# Patient Record
Sex: FEMALE | Race: WHITE | NOT HISPANIC OR LATINO | Employment: OTHER | URBAN - METROPOLITAN AREA
[De-identification: names, ages, dates, MRNs, and addresses within clinical notes are randomized per-mention and may not be internally consistent; named-entity substitution may affect disease eponyms.]

---

## 2017-01-06 ENCOUNTER — COMMUNICATION - HEALTHEAST (OUTPATIENT)
Dept: NURSING | Facility: CLINIC | Age: 62
End: 2017-01-06

## 2017-01-06 DIAGNOSIS — D68.51 FACTOR V LEIDEN (H): ICD-10-CM

## 2017-01-06 DIAGNOSIS — Z86.718 HISTORY OF DVT (DEEP VEIN THROMBOSIS): ICD-10-CM

## 2017-01-12 ENCOUNTER — COMMUNICATION - HEALTHEAST (OUTPATIENT)
Dept: FAMILY MEDICINE | Facility: CLINIC | Age: 62
End: 2017-01-12

## 2017-01-13 ENCOUNTER — COMMUNICATION - HEALTHEAST (OUTPATIENT)
Dept: FAMILY MEDICINE | Facility: CLINIC | Age: 62
End: 2017-01-13

## 2017-01-13 DIAGNOSIS — R06.2 WHEEZING: ICD-10-CM

## 2017-01-19 ENCOUNTER — COMMUNICATION - HEALTHEAST (OUTPATIENT)
Dept: FAMILY MEDICINE | Facility: CLINIC | Age: 62
End: 2017-01-19

## 2017-01-19 DIAGNOSIS — F33.9 MAJOR DEPRESSIVE DISORDER, RECURRENT EPISODE (H): ICD-10-CM

## 2017-02-02 ENCOUNTER — COMMUNICATION - HEALTHEAST (OUTPATIENT)
Dept: FAMILY MEDICINE | Facility: CLINIC | Age: 62
End: 2017-02-02

## 2017-02-02 DIAGNOSIS — R06.2 WHEEZING: ICD-10-CM

## 2017-03-16 ENCOUNTER — COMMUNICATION - HEALTHEAST (OUTPATIENT)
Dept: FAMILY MEDICINE | Facility: CLINIC | Age: 62
End: 2017-03-16

## 2017-03-16 DIAGNOSIS — G47.00 INSOMNIA: ICD-10-CM

## 2017-03-17 ENCOUNTER — COMMUNICATION - HEALTHEAST (OUTPATIENT)
Dept: FAMILY MEDICINE | Facility: CLINIC | Age: 62
End: 2017-03-17

## 2017-03-17 DIAGNOSIS — D68.2 FACTOR V DEFICIENCY (H): ICD-10-CM

## 2017-03-17 DIAGNOSIS — Z86.718 HISTORY OF DVT (DEEP VEIN THROMBOSIS): ICD-10-CM

## 2017-03-23 ENCOUNTER — COMMUNICATION - HEALTHEAST (OUTPATIENT)
Dept: LAB | Facility: CLINIC | Age: 62
End: 2017-03-23

## 2017-03-28 ENCOUNTER — COMMUNICATION - HEALTHEAST (OUTPATIENT)
Dept: FAMILY MEDICINE | Facility: CLINIC | Age: 62
End: 2017-03-28

## 2017-03-30 ENCOUNTER — AMBULATORY - HEALTHEAST (OUTPATIENT)
Dept: FAMILY MEDICINE | Facility: CLINIC | Age: 62
End: 2017-03-30

## 2017-03-30 DIAGNOSIS — I82.409 DVT (DEEP VENOUS THROMBOSIS) (H): ICD-10-CM

## 2017-04-20 ENCOUNTER — OFFICE VISIT - HEALTHEAST (OUTPATIENT)
Dept: FAMILY MEDICINE | Facility: CLINIC | Age: 62
End: 2017-04-20

## 2017-04-20 ENCOUNTER — AMBULATORY - HEALTHEAST (OUTPATIENT)
Dept: FAMILY MEDICINE | Facility: CLINIC | Age: 62
End: 2017-04-20

## 2017-04-20 DIAGNOSIS — F33.9 MAJOR DEPRESSIVE DISORDER, RECURRENT EPISODE (H): ICD-10-CM

## 2017-04-20 DIAGNOSIS — M54.12 CERVICAL RADICULOPATHY: ICD-10-CM

## 2017-04-20 DIAGNOSIS — G47.00 INSOMNIA: ICD-10-CM

## 2017-04-20 DIAGNOSIS — I82.409 DVT (DEEP VENOUS THROMBOSIS) (H): ICD-10-CM

## 2017-04-21 ENCOUNTER — COMMUNICATION - HEALTHEAST (OUTPATIENT)
Dept: INTERNAL MEDICINE | Facility: CLINIC | Age: 62
End: 2017-04-21

## 2017-04-21 DIAGNOSIS — I82.409 DVT (DEEP VENOUS THROMBOSIS) (H): ICD-10-CM

## 2017-04-21 DIAGNOSIS — D68.51 FACTOR V LEIDEN (H): ICD-10-CM

## 2017-05-13 ENCOUNTER — COMMUNICATION - HEALTHEAST (OUTPATIENT)
Dept: FAMILY MEDICINE | Facility: CLINIC | Age: 62
End: 2017-05-13

## 2017-05-18 ENCOUNTER — COMMUNICATION - HEALTHEAST (OUTPATIENT)
Dept: INTERNAL MEDICINE | Facility: CLINIC | Age: 62
End: 2017-05-18

## 2017-06-05 ENCOUNTER — AMBULATORY - HEALTHEAST (OUTPATIENT)
Dept: LAB | Facility: CLINIC | Age: 62
End: 2017-06-05

## 2017-06-05 ENCOUNTER — COMMUNICATION - HEALTHEAST (OUTPATIENT)
Dept: INTERNAL MEDICINE | Facility: CLINIC | Age: 62
End: 2017-06-05

## 2017-06-05 DIAGNOSIS — D68.51 FACTOR V LEIDEN (H): ICD-10-CM

## 2017-06-05 DIAGNOSIS — I82.409 DVT (DEEP VENOUS THROMBOSIS) (H): ICD-10-CM

## 2017-06-16 ENCOUNTER — COMMUNICATION - HEALTHEAST (OUTPATIENT)
Dept: FAMILY MEDICINE | Facility: CLINIC | Age: 62
End: 2017-06-16

## 2017-07-14 ENCOUNTER — COMMUNICATION - HEALTHEAST (OUTPATIENT)
Dept: FAMILY MEDICINE | Facility: CLINIC | Age: 62
End: 2017-07-14

## 2017-07-14 DIAGNOSIS — F32.A DEPRESSION: ICD-10-CM

## 2017-07-24 ENCOUNTER — COMMUNICATION - HEALTHEAST (OUTPATIENT)
Dept: INTERNAL MEDICINE | Facility: CLINIC | Age: 62
End: 2017-07-24

## 2017-07-28 ENCOUNTER — COMMUNICATION - HEALTHEAST (OUTPATIENT)
Dept: FAMILY MEDICINE | Facility: CLINIC | Age: 62
End: 2017-07-28

## 2017-07-28 DIAGNOSIS — D68.2 FACTOR V DEFICIENCY (H): ICD-10-CM

## 2017-07-28 DIAGNOSIS — Z86.718 HISTORY OF DVT (DEEP VEIN THROMBOSIS): ICD-10-CM

## 2017-08-17 ENCOUNTER — COMMUNICATION - HEALTHEAST (OUTPATIENT)
Dept: SCHEDULING | Facility: CLINIC | Age: 62
End: 2017-08-17

## 2017-08-18 ENCOUNTER — COMMUNICATION - HEALTHEAST (OUTPATIENT)
Dept: INTERNAL MEDICINE | Facility: CLINIC | Age: 62
End: 2017-08-18

## 2017-09-07 ENCOUNTER — COMMUNICATION - HEALTHEAST (OUTPATIENT)
Dept: INTERNAL MEDICINE | Facility: CLINIC | Age: 62
End: 2017-09-07

## 2017-10-09 ENCOUNTER — COMMUNICATION - HEALTHEAST (OUTPATIENT)
Dept: FAMILY MEDICINE | Facility: CLINIC | Age: 62
End: 2017-10-09

## 2017-10-09 DIAGNOSIS — G47.00 INSOMNIA: ICD-10-CM

## 2017-10-09 DIAGNOSIS — F32.A DEPRESSION: ICD-10-CM

## 2017-10-19 ENCOUNTER — OFFICE VISIT - HEALTHEAST (OUTPATIENT)
Dept: FAMILY MEDICINE | Facility: CLINIC | Age: 62
End: 2017-10-19

## 2017-10-19 ENCOUNTER — RECORDS - HEALTHEAST (OUTPATIENT)
Dept: GENERAL RADIOLOGY | Facility: CLINIC | Age: 62
End: 2017-10-19

## 2017-10-19 DIAGNOSIS — Z86.718 HISTORY OF DVT (DEEP VEIN THROMBOSIS): ICD-10-CM

## 2017-10-19 DIAGNOSIS — D68.51 FACTOR V LEIDEN (H): ICD-10-CM

## 2017-10-19 DIAGNOSIS — M79.646 FINGER PAIN: ICD-10-CM

## 2017-10-19 DIAGNOSIS — M79.646 PAIN IN UNSPECIFIED FINGER(S): ICD-10-CM

## 2017-10-19 DIAGNOSIS — F33.9 MAJOR DEPRESSIVE DISORDER, RECURRENT EPISODE (H): ICD-10-CM

## 2017-10-22 ENCOUNTER — COMMUNICATION - HEALTHEAST (OUTPATIENT)
Dept: FAMILY MEDICINE | Facility: CLINIC | Age: 62
End: 2017-10-22

## 2017-10-22 DIAGNOSIS — Z86.718 HISTORY OF DVT (DEEP VEIN THROMBOSIS): ICD-10-CM

## 2017-10-22 DIAGNOSIS — D68.2 FACTOR V DEFICIENCY (H): ICD-10-CM

## 2017-10-23 ENCOUNTER — COMMUNICATION - HEALTHEAST (OUTPATIENT)
Dept: FAMILY MEDICINE | Facility: CLINIC | Age: 62
End: 2017-10-23

## 2017-10-23 ENCOUNTER — COMMUNICATION - HEALTHEAST (OUTPATIENT)
Dept: INTERNAL MEDICINE | Facility: CLINIC | Age: 62
End: 2017-10-23

## 2017-10-23 LAB — ANA SER QL: 0.1 U

## 2017-12-08 ENCOUNTER — COMMUNICATION - HEALTHEAST (OUTPATIENT)
Dept: FAMILY MEDICINE | Facility: CLINIC | Age: 62
End: 2017-12-08

## 2017-12-08 DIAGNOSIS — F32.A DEPRESSION: ICD-10-CM

## 2017-12-29 ENCOUNTER — COMMUNICATION - HEALTHEAST (OUTPATIENT)
Dept: FAMILY MEDICINE | Facility: CLINIC | Age: 62
End: 2017-12-29

## 2017-12-29 ENCOUNTER — COMMUNICATION - HEALTHEAST (OUTPATIENT)
Dept: SCHEDULING | Facility: CLINIC | Age: 62
End: 2017-12-29

## 2017-12-29 DIAGNOSIS — R06.2 WHEEZING: ICD-10-CM

## 2017-12-29 DIAGNOSIS — M79.646 FINGER PAIN: ICD-10-CM

## 2018-01-05 ENCOUNTER — COMMUNICATION - HEALTHEAST (OUTPATIENT)
Dept: FAMILY MEDICINE | Facility: CLINIC | Age: 63
End: 2018-01-05

## 2018-01-05 DIAGNOSIS — G47.00 INSOMNIA: ICD-10-CM

## 2018-01-05 DIAGNOSIS — F32.A DEPRESSION: ICD-10-CM

## 2018-02-07 ENCOUNTER — OFFICE VISIT - HEALTHEAST (OUTPATIENT)
Dept: FAMILY MEDICINE | Facility: CLINIC | Age: 63
End: 2018-02-07

## 2018-02-07 ENCOUNTER — RECORDS - HEALTHEAST (OUTPATIENT)
Dept: GENERAL RADIOLOGY | Facility: CLINIC | Age: 63
End: 2018-02-07
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
## 2018-02-07 DIAGNOSIS — F33.1 MODERATE EPISODE OF RECURRENT MAJOR DEPRESSIVE DISORDER (H): ICD-10-CM

## 2018-02-07 DIAGNOSIS — M54.2 NECK PAIN ON LEFT SIDE: ICD-10-CM
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
## 2018-02-07 DIAGNOSIS — Z12.31 VISIT FOR SCREENING MAMMOGRAM: ICD-10-CM

## 2018-02-08 ENCOUNTER — COMMUNICATION - HEALTHEAST (OUTPATIENT)
Dept: FAMILY MEDICINE | Facility: CLINIC | Age: 63
End: 2018-02-08

## 2018-02-21 ENCOUNTER — OFFICE VISIT - HEALTHEAST (OUTPATIENT)
Dept: RHEUMATOLOGY | Facility: CLINIC | Age: 63
End: 2018-02-21

## 2018-02-21 DIAGNOSIS — M25.50 POLYARTHRALGIA: ICD-10-CM

## 2018-02-21 DIAGNOSIS — M15.0 PRIMARY OSTEOARTHRITIS INVOLVING MULTIPLE JOINTS: ICD-10-CM

## 2018-02-21 LAB
ALBUMIN SERPL-MCNC: 3.3 G/DL (ref 3.5–5)
ALT SERPL W P-5'-P-CCNC: 25 U/L (ref 0–45)
BASOPHILS # BLD AUTO: 0.1 THOU/UL (ref 0–0.2)
BASOPHILS NFR BLD AUTO: 1 % (ref 0–2)
C REACTIVE PROTEIN LHE: 0.2 MG/DL (ref 0–0.8)
CREAT SERPL-MCNC: 0.72 MG/DL (ref 0.6–1.1)
EOSINOPHIL # BLD AUTO: 0.2 THOU/UL (ref 0–0.4)
EOSINOPHIL NFR BLD AUTO: 3 % (ref 0–6)
ERYTHROCYTE [DISTWIDTH] IN BLOOD BY AUTOMATED COUNT: 13.1 % (ref 11–14.5)
ERYTHROCYTE [SEDIMENTATION RATE] IN BLOOD BY WESTERGREN METHOD: 28 MM/HR (ref 0–20)
GFR SERPL CREATININE-BSD FRML MDRD: >60 ML/MIN/1.73M2
HCT VFR BLD AUTO: 41.3 % (ref 35–47)
HGB BLD-MCNC: 14 G/DL (ref 12–16)
LYMPHOCYTES # BLD AUTO: 2.3 THOU/UL (ref 0.8–4.4)
LYMPHOCYTES NFR BLD AUTO: 37 % (ref 20–40)
MCH RBC QN AUTO: 30.7 PG (ref 27–34)
MCHC RBC AUTO-ENTMCNC: 33.8 G/DL (ref 32–36)
MCV RBC AUTO: 91 FL (ref 80–100)
MONOCYTES # BLD AUTO: 0.4 THOU/UL (ref 0–0.9)
MONOCYTES NFR BLD AUTO: 7 % (ref 2–10)
NEUTROPHILS # BLD AUTO: 3.2 THOU/UL (ref 2–7.7)
NEUTROPHILS NFR BLD AUTO: 51 % (ref 50–70)
PLATELET # BLD AUTO: 276 THOU/UL (ref 140–440)
PMV BLD AUTO: 7.4 FL (ref 7–10)
RBC # BLD AUTO: 4.54 MILL/UL (ref 3.8–5.4)
RHEUMATOID FACT SERPL-ACNC: <15 IU/ML (ref 0–30)
WBC: 6.2 THOU/UL (ref 4–11)

## 2018-02-21 ASSESSMENT — MIFFLIN-ST. JEOR: SCORE: 1451.27

## 2018-02-22 LAB
ANA SER QL: 0.1 U
CCP AB SER IA-ACNC: <0.5 U/ML
HCV AB SERPL QL IA: NEGATIVE

## 2018-02-25 ENCOUNTER — NURSE TRIAGE (OUTPATIENT)
Dept: NURSING | Facility: CLINIC | Age: 63
End: 2018-02-25

## 2018-02-25 NOTE — TELEPHONE ENCOUNTER
Reason for Disposition    Cough present > 10 days    Additional Information    Negative: Severe difficulty breathing (e.g., struggling for each breath, speaks in single words)    Negative: Bluish lips, tongue, or face now    Negative: [1] Difficulty breathing AND [2] exposure to flames, smoke, or fumes    Negative: [1] Stridor AND [2] difficulty breathing    Negative: Sounds like a life-threatening emergency to the triager    Negative: [1] Previous asthma attacks AND [2] this feels like asthma attack    Negative: Dry (non-productive) cough (i.e., no sputum or minimal clear sputum)    Negative: Chest pain  (Exception: MILD central chest pain, present only when coughing)    Negative: Difficulty breathing    Negative: Patient sounds very sick or weak to the triager    Negative: [1] Coughed up blood AND [2] > 1 tablespoon (15 ml) (Exception: blood-tinged sputum)    Negative: Fever > 103 F (39.4 C)    Negative: [1] Fever > 101 F (38.3 C) AND [2] age > 60    Negative: [1] Fever > 101 F (38.3 C) AND [2] bedridden (e.g., nursing home patient, CVA, chronic illness, recovering from surgery)    Negative: [1] Fever > 100.5 F (38.1 C) AND [2] diabetes mellitus or weak immune system (e.g., HIV positive, cancer chemo, splenectomy, organ transplant, chronic steroids)    Negative: Wheezing is present    Negative: SEVERE coughing spells (e.g., whooping sound after coughing, vomiting after coughing)    Negative: [1] Continuous (nonstop) coughing interferes with work or school AND [2] no improvement using cough treatment per Care Advice    Negative: Coughing up monica-colored (reddish-brown) sputum    Negative: Fever present > 3 days (72 hours)    Negative: [1] Fever returns after gone for over 24 hours AND [2] symptoms worse or not improved    Negative: [1] Sinus pain (around cheekbone or eye) AND [2] present > 24 hours using nasal washes and pain meds    Negative: Earache    Negative: [1] Known COPD or other severe lung disease  (i.e., bronchiectasis, cystic fibrosis, lung surgery) AND [2] worsening symptoms (i.e., increased sputum purulence or amount, increased breathing difficulty    Negative: [1] Coughed up blood-tinged sputum AND [2] more than once    Protocols used: COUGH - ACUTE PRODUCTIVE-ADULT-AH

## 2018-02-26 ENCOUNTER — OFFICE VISIT - HEALTHEAST (OUTPATIENT)
Dept: FAMILY MEDICINE | Facility: CLINIC | Age: 63
End: 2018-02-26

## 2018-02-26 DIAGNOSIS — I82.409 DVT (DEEP VENOUS THROMBOSIS) (H): ICD-10-CM

## 2018-02-26 DIAGNOSIS — R05.9 COUGH: ICD-10-CM

## 2018-02-26 LAB — INR PPP: 2.6 (ref 0.9–1.1)

## 2018-04-18 ENCOUNTER — COMMUNICATION - HEALTHEAST (OUTPATIENT)
Dept: FAMILY MEDICINE | Facility: CLINIC | Age: 63
End: 2018-04-18

## 2018-04-18 DIAGNOSIS — G47.00 INSOMNIA: ICD-10-CM

## 2018-05-01 ENCOUNTER — COMMUNICATION - HEALTHEAST (OUTPATIENT)
Dept: SCHEDULING | Facility: CLINIC | Age: 63
End: 2018-05-01

## 2018-05-04 ENCOUNTER — COMMUNICATION - HEALTHEAST (OUTPATIENT)
Dept: FAMILY MEDICINE | Facility: CLINIC | Age: 63
End: 2018-05-04

## 2018-05-04 DIAGNOSIS — Z86.718 HISTORY OF DVT (DEEP VEIN THROMBOSIS): ICD-10-CM

## 2018-05-04 DIAGNOSIS — D68.2 FACTOR V DEFICIENCY (H): ICD-10-CM

## 2018-05-23 ENCOUNTER — COMMUNICATION - HEALTHEAST (OUTPATIENT)
Dept: FAMILY MEDICINE | Facility: CLINIC | Age: 63
End: 2018-05-23

## 2018-05-23 DIAGNOSIS — F33.1 MODERATE EPISODE OF RECURRENT MAJOR DEPRESSIVE DISORDER (H): ICD-10-CM

## 2018-07-12 ENCOUNTER — COMMUNICATION - HEALTHEAST (OUTPATIENT)
Dept: FAMILY MEDICINE | Facility: CLINIC | Age: 63
End: 2018-07-12

## 2018-07-12 DIAGNOSIS — G47.00 INSOMNIA: ICD-10-CM

## 2018-08-01 ENCOUNTER — COMMUNICATION - HEALTHEAST (OUTPATIENT)
Dept: FAMILY MEDICINE | Facility: CLINIC | Age: 63
End: 2018-08-01

## 2018-08-01 DIAGNOSIS — D68.2 FACTOR V DEFICIENCY (H): ICD-10-CM

## 2018-08-01 DIAGNOSIS — Z86.718 HISTORY OF DVT (DEEP VEIN THROMBOSIS): ICD-10-CM

## 2018-09-19 ENCOUNTER — COMMUNICATION - HEALTHEAST (OUTPATIENT)
Dept: ANTICOAGULATION | Facility: CLINIC | Age: 63
End: 2018-09-19

## 2018-09-19 ENCOUNTER — AMBULATORY - HEALTHEAST (OUTPATIENT)
Dept: ANTICOAGULATION | Facility: CLINIC | Age: 63
End: 2018-09-19

## 2018-09-19 ENCOUNTER — OFFICE VISIT - HEALTHEAST (OUTPATIENT)
Dept: FAMILY MEDICINE | Facility: CLINIC | Age: 63
End: 2018-09-19

## 2018-09-19 DIAGNOSIS — D68.51 FACTOR V LEIDEN (H): ICD-10-CM

## 2018-09-19 DIAGNOSIS — F43.21 GRIEF REACTION: ICD-10-CM

## 2018-09-19 DIAGNOSIS — I82.409 DEEP VEIN THROMBOSIS (DVT) OF LOWER EXTREMITY (H): ICD-10-CM

## 2018-09-19 DIAGNOSIS — Z71.6 TOBACCO ABUSE COUNSELING: ICD-10-CM

## 2018-09-19 DIAGNOSIS — F33.1 MODERATE EPISODE OF RECURRENT MAJOR DEPRESSIVE DISORDER (H): ICD-10-CM

## 2018-09-19 DIAGNOSIS — I82.811 VENOUS EMBOLISM AND THROMBOSIS OF SUPERFICIAL VESSELS OF RIGHT LOWER EXTREMITY: ICD-10-CM

## 2018-09-19 LAB — INR PPP: 2.6 (ref 0.9–1.1)

## 2018-09-19 ASSESSMENT — MIFFLIN-ST. JEOR: SCORE: 1424.05

## 2018-09-26 ENCOUNTER — TELEPHONE (OUTPATIENT)
Dept: OTHER | Facility: CLINIC | Age: 63
End: 2018-09-26

## 2018-10-01 ENCOUNTER — COMMUNICATION - HEALTHEAST (OUTPATIENT)
Dept: FAMILY MEDICINE | Facility: CLINIC | Age: 63
End: 2018-10-01

## 2018-10-01 DIAGNOSIS — D68.51 FACTOR V LEIDEN (H): ICD-10-CM

## 2018-10-18 ENCOUNTER — COMMUNICATION - HEALTHEAST (OUTPATIENT)
Dept: ANTICOAGULATION | Facility: CLINIC | Age: 63
End: 2018-10-18

## 2018-10-18 ENCOUNTER — AMBULATORY - HEALTHEAST (OUTPATIENT)
Dept: LAB | Facility: CLINIC | Age: 63
End: 2018-10-18

## 2018-10-18 DIAGNOSIS — D68.51 FACTOR V LEIDEN (H): ICD-10-CM

## 2018-10-18 LAB — INR PPP: 2.2 (ref 0.9–1.1)

## 2018-10-28 ENCOUNTER — COMMUNICATION - HEALTHEAST (OUTPATIENT)
Dept: FAMILY MEDICINE | Facility: CLINIC | Age: 63
End: 2018-10-28

## 2018-10-28 DIAGNOSIS — D68.2 FACTOR V DEFICIENCY (H): ICD-10-CM

## 2018-10-28 DIAGNOSIS — Z86.718 HISTORY OF DVT (DEEP VEIN THROMBOSIS): ICD-10-CM

## 2018-11-08 ENCOUNTER — COMMUNICATION - HEALTHEAST (OUTPATIENT)
Dept: ANTICOAGULATION | Facility: CLINIC | Age: 63
End: 2018-11-08

## 2018-11-14 ENCOUNTER — COMMUNICATION - HEALTHEAST (OUTPATIENT)
Dept: ANTICOAGULATION | Facility: CLINIC | Age: 63
End: 2018-11-14

## 2018-11-14 ENCOUNTER — AMBULATORY - HEALTHEAST (OUTPATIENT)
Dept: LAB | Facility: CLINIC | Age: 63
End: 2018-11-14

## 2018-11-14 DIAGNOSIS — D68.51 FACTOR V LEIDEN (H): ICD-10-CM

## 2018-11-14 LAB — INR PPP: 2.7 (ref 0.9–1.1)

## 2018-11-20 ENCOUNTER — COMMUNICATION - HEALTHEAST (OUTPATIENT)
Dept: ANTICOAGULATION | Facility: CLINIC | Age: 63
End: 2018-11-20

## 2018-11-20 DIAGNOSIS — D68.51 FACTOR V LEIDEN (H): ICD-10-CM

## 2018-11-20 DIAGNOSIS — I82.409 DEEP VEIN THROMBOSIS (DVT) OF LOWER EXTREMITY (H): ICD-10-CM

## 2018-12-19 ENCOUNTER — COMMUNICATION - HEALTHEAST (OUTPATIENT)
Dept: ANTICOAGULATION | Facility: CLINIC | Age: 63
End: 2018-12-19

## 2019-02-12 ENCOUNTER — COMMUNICATION - HEALTHEAST (OUTPATIENT)
Dept: FAMILY MEDICINE | Facility: CLINIC | Age: 64
End: 2019-02-12

## 2019-02-28 ENCOUNTER — COMMUNICATION - HEALTHEAST (OUTPATIENT)
Dept: ANTICOAGULATION | Facility: CLINIC | Age: 64
End: 2019-02-28

## 2019-02-28 ENCOUNTER — OFFICE VISIT - HEALTHEAST (OUTPATIENT)
Dept: FAMILY MEDICINE | Facility: CLINIC | Age: 64
End: 2019-02-28

## 2019-02-28 ENCOUNTER — COMMUNICATION - HEALTHEAST (OUTPATIENT)
Dept: TELEHEALTH | Facility: CLINIC | Age: 64
End: 2019-02-28

## 2019-02-28 DIAGNOSIS — D68.51 FACTOR V LEIDEN (H): ICD-10-CM

## 2019-02-28 DIAGNOSIS — G47.00 INSOMNIA: ICD-10-CM

## 2019-02-28 DIAGNOSIS — I83.811 VARICOSE VEINS OF RIGHT LOWER EXTREMITY WITH PAIN: ICD-10-CM

## 2019-02-28 DIAGNOSIS — Z12.4 CERVICAL CANCER SCREENING: ICD-10-CM

## 2019-02-28 DIAGNOSIS — Z11.59 ENCOUNTER FOR HEPATITIS C SCREENING TEST FOR LOW RISK PATIENT: ICD-10-CM

## 2019-02-28 DIAGNOSIS — I82.409 DEEP VEIN THROMBOSIS (DVT) OF LOWER EXTREMITY (H): ICD-10-CM

## 2019-02-28 DIAGNOSIS — N89.8 VAGINAL DISCHARGE: ICD-10-CM

## 2019-02-28 DIAGNOSIS — F51.01 PRIMARY INSOMNIA: ICD-10-CM

## 2019-02-28 DIAGNOSIS — Z12.31 VISIT FOR SCREENING MAMMOGRAM: ICD-10-CM

## 2019-02-28 DIAGNOSIS — F33.1 MODERATE EPISODE OF RECURRENT MAJOR DEPRESSIVE DISORDER (H): ICD-10-CM

## 2019-02-28 DIAGNOSIS — Z00.00 HEALTHCARE MAINTENANCE: ICD-10-CM

## 2019-02-28 DIAGNOSIS — Z71.6 TOBACCO ABUSE COUNSELING: ICD-10-CM

## 2019-02-28 LAB
ALBUMIN SERPL-MCNC: 3.8 G/DL (ref 3.5–5)
ALP SERPL-CCNC: 94 U/L (ref 45–120)
ALT SERPL W P-5'-P-CCNC: 31 U/L (ref 0–45)
ANION GAP SERPL CALCULATED.3IONS-SCNC: 8 MMOL/L (ref 5–18)
AST SERPL W P-5'-P-CCNC: 29 U/L (ref 0–40)
BILIRUB SERPL-MCNC: 0.3 MG/DL (ref 0–1)
BUN SERPL-MCNC: 12 MG/DL (ref 8–22)
CALCIUM SERPL-MCNC: 9.2 MG/DL (ref 8.5–10.5)
CHLORIDE BLD-SCNC: 104 MMOL/L (ref 98–107)
CHOLEST SERPL-MCNC: 182 MG/DL
CLUE CELLS: NORMAL
CO2 SERPL-SCNC: 27 MMOL/L (ref 22–31)
CREAT SERPL-MCNC: 0.8 MG/DL (ref 0.6–1.1)
FASTING STATUS PATIENT QL REPORTED: NO
GFR SERPL CREATININE-BSD FRML MDRD: >60 ML/MIN/1.73M2
GLUCOSE BLD-MCNC: 82 MG/DL (ref 70–125)
HDLC SERPL-MCNC: 80 MG/DL
INR PPP: 2.9 (ref 0.9–1.1)
LDLC SERPL CALC-MCNC: 89 MG/DL
POTASSIUM BLD-SCNC: 4.3 MMOL/L (ref 3.5–5)
PROT SERPL-MCNC: 6.6 G/DL (ref 6–8)
SODIUM SERPL-SCNC: 139 MMOL/L (ref 136–145)
TRICHOMONAS, WET PREP: NORMAL
TRIGL SERPL-MCNC: 67 MG/DL
YEAST, WET PREP: NORMAL

## 2019-02-28 ASSESSMENT — MIFFLIN-ST. JEOR: SCORE: 1419.52

## 2019-03-01 LAB
HCV AB SERPL QL IA: NEGATIVE
HPV SOURCE: ABNORMAL
HUMAN PAPILLOMA VIRUS 16 DNA: NEGATIVE
HUMAN PAPILLOMA VIRUS 18 DNA: NEGATIVE
HUMAN PAPILLOMA VIRUS FINAL DIAGNOSIS: ABNORMAL
HUMAN PAPILLOMA VIRUS OTHER HR: POSITIVE
SPECIMEN DESCRIPTION: ABNORMAL

## 2019-03-09 ENCOUNTER — COMMUNICATION - HEALTHEAST (OUTPATIENT)
Dept: FAMILY MEDICINE | Facility: CLINIC | Age: 64
End: 2019-03-09

## 2019-03-09 DIAGNOSIS — F33.1 MODERATE EPISODE OF RECURRENT MAJOR DEPRESSIVE DISORDER (H): ICD-10-CM

## 2019-03-11 LAB
BKR LAB AP ABNORMAL BLEEDING: NO
BKR LAB AP BIRTH CONTROL/HORMONES: NORMAL
BKR LAB AP CERVICAL APPEARANCE: NORMAL
BKR LAB AP GYN ADEQUACY: NORMAL
BKR LAB AP GYN INTERPRETATION: NORMAL
BKR LAB AP HPV REFLEX: NORMAL
BKR LAB AP LMP: NORMAL
BKR LAB AP PATIENT STATUS: NORMAL
BKR LAB AP PREVIOUS ABNORMAL: NO
BKR LAB AP PREVIOUS NORMAL: NORMAL
HIGH RISK?: NO
PATH REPORT.COMMENTS IMP SPEC: NORMAL
RESULT FLAG (HE HISTORICAL CONVERSION): NORMAL

## 2019-03-12 ENCOUNTER — COMMUNICATION - HEALTHEAST (OUTPATIENT)
Dept: FAMILY MEDICINE | Facility: CLINIC | Age: 64
End: 2019-03-12

## 2019-03-18 ENCOUNTER — COMMUNICATION - HEALTHEAST (OUTPATIENT)
Dept: FAMILY MEDICINE | Facility: CLINIC | Age: 64
End: 2019-03-18

## 2019-03-20 ENCOUNTER — COMMUNICATION - HEALTHEAST (OUTPATIENT)
Dept: FAMILY MEDICINE | Facility: CLINIC | Age: 64
End: 2019-03-20

## 2019-03-22 ENCOUNTER — COMMUNICATION - HEALTHEAST (OUTPATIENT)
Dept: FAMILY MEDICINE | Facility: CLINIC | Age: 64
End: 2019-03-22

## 2019-03-22 ENCOUNTER — HOSPITAL ENCOUNTER (OUTPATIENT)
Dept: CT IMAGING | Facility: HOSPITAL | Age: 64
Discharge: HOME | End: 2019-03-22
Attending: FAMILY MEDICINE

## 2019-03-22 DIAGNOSIS — Z71.6 TOBACCO ABUSE COUNSELING: ICD-10-CM

## 2019-03-27 ENCOUNTER — COMMUNICATION - HEALTHEAST (OUTPATIENT)
Dept: FAMILY MEDICINE | Facility: CLINIC | Age: 64
End: 2019-03-27

## 2019-04-04 ENCOUNTER — COMMUNICATION - HEALTHEAST (OUTPATIENT)
Dept: ANTICOAGULATION | Facility: CLINIC | Age: 64
End: 2019-04-04

## 2019-05-02 ENCOUNTER — OFFICE VISIT - HEALTHEAST (OUTPATIENT)
Dept: FAMILY MEDICINE | Facility: CLINIC | Age: 64
End: 2019-05-02

## 2019-05-02 ENCOUNTER — COMMUNICATION - HEALTHEAST (OUTPATIENT)
Dept: ANTICOAGULATION | Facility: CLINIC | Age: 64
End: 2019-05-02

## 2019-05-02 ENCOUNTER — COMMUNICATION - HEALTHEAST (OUTPATIENT)
Dept: FAMILY MEDICINE | Facility: CLINIC | Age: 64
End: 2019-05-02

## 2019-05-02 DIAGNOSIS — D68.51 FACTOR V LEIDEN (H): ICD-10-CM

## 2019-05-02 DIAGNOSIS — D68.2 FACTOR V DEFICIENCY (H): ICD-10-CM

## 2019-05-02 DIAGNOSIS — Z86.718 HISTORY OF DVT (DEEP VEIN THROMBOSIS): ICD-10-CM

## 2019-05-02 DIAGNOSIS — J18.1 LOBAR PNEUMONIA, UNSPECIFIED ORGANISM (H): ICD-10-CM

## 2019-05-02 DIAGNOSIS — I82.409 DEEP VEIN THROMBOSIS (DVT) OF LOWER EXTREMITY (H): ICD-10-CM

## 2019-05-02 DIAGNOSIS — R07.89 CHEST WALL PAIN: ICD-10-CM

## 2019-05-02 DIAGNOSIS — J18.9 COMMUNITY ACQUIRED PNEUMONIA OF RIGHT LOWER LOBE OF LUNG: ICD-10-CM

## 2019-05-02 LAB — INR PPP: 3.5 (ref 0.9–1.1)

## 2019-05-02 ASSESSMENT — MIFFLIN-ST. JEOR: SCORE: 1437.66

## 2019-05-07 ENCOUNTER — COMMUNICATION - HEALTHEAST (OUTPATIENT)
Dept: ANTICOAGULATION | Facility: CLINIC | Age: 64
End: 2019-05-07

## 2019-05-17 ENCOUNTER — COMMUNICATION - HEALTHEAST (OUTPATIENT)
Dept: SCHEDULING | Facility: CLINIC | Age: 64
End: 2019-05-17

## 2019-05-17 ENCOUNTER — RECORDS - HEALTHEAST (OUTPATIENT)
Dept: GENERAL RADIOLOGY | Facility: CLINIC | Age: 64
End: 2019-05-17

## 2019-05-17 ENCOUNTER — OFFICE VISIT - HEALTHEAST (OUTPATIENT)
Dept: FAMILY MEDICINE | Facility: CLINIC | Age: 64
End: 2019-05-17

## 2019-05-17 ENCOUNTER — COMMUNICATION - HEALTHEAST (OUTPATIENT)
Dept: ANTICOAGULATION | Facility: CLINIC | Age: 64
End: 2019-05-17

## 2019-05-17 DIAGNOSIS — D68.51 FACTOR V LEIDEN (H): ICD-10-CM

## 2019-05-17 DIAGNOSIS — M79.661 PAIN IN RIGHT LOWER LEG: ICD-10-CM

## 2019-05-17 DIAGNOSIS — I82.409 DEEP VEIN THROMBOSIS (DVT) OF LOWER EXTREMITY (H): ICD-10-CM

## 2019-05-17 DIAGNOSIS — M79.661 PAIN OF RIGHT LOWER LEG: ICD-10-CM

## 2019-05-17 DIAGNOSIS — S80.11XA CONTUSION OF RIGHT LOWER EXTREMITY, INITIAL ENCOUNTER: ICD-10-CM

## 2019-05-17 LAB
ERYTHROCYTE [DISTWIDTH] IN BLOOD BY AUTOMATED COUNT: 13.4 % (ref 11–14.5)
HCT VFR BLD AUTO: 40.8 % (ref 35–47)
HGB BLD-MCNC: 13.4 G/DL (ref 12–16)
INR PPP: 2.2 (ref 0.9–1.1)
MCH RBC QN AUTO: 30.6 PG (ref 27–34)
MCHC RBC AUTO-ENTMCNC: 32.8 G/DL (ref 32–36)
MCV RBC AUTO: 93 FL (ref 80–100)
PLATELET # BLD AUTO: 258 THOU/UL (ref 140–440)
PMV BLD AUTO: 7.6 FL (ref 7–10)
RBC # BLD AUTO: 4.37 MILL/UL (ref 3.8–5.4)
WBC: 4.7 THOU/UL (ref 4–11)

## 2019-05-17 ASSESSMENT — MIFFLIN-ST. JEOR: SCORE: 1461.19

## 2019-05-23 ASSESSMENT — MIFFLIN-ST. JEOR: SCORE: 1460.06

## 2019-05-24 ENCOUNTER — SURGERY - HEALTHEAST (OUTPATIENT)
Dept: SURGERY | Facility: HOSPITAL | Age: 64
End: 2019-05-24

## 2019-05-24 ENCOUNTER — ANESTHESIA - HEALTHEAST (OUTPATIENT)
Dept: SURGERY | Facility: HOSPITAL | Age: 64
End: 2019-05-24

## 2019-05-26 ENCOUNTER — HOME CARE/HOSPICE - HEALTHEAST (OUTPATIENT)
Dept: HOME HEALTH SERVICES | Facility: HOME HEALTH | Age: 64
End: 2019-05-26

## 2019-05-27 ENCOUNTER — COMMUNICATION - HEALTHEAST (OUTPATIENT)
Dept: HOME HEALTH SERVICES | Facility: HOME HEALTH | Age: 64
End: 2019-05-27

## 2019-05-28 ENCOUNTER — OFFICE VISIT - HEALTHEAST (OUTPATIENT)
Dept: FAMILY MEDICINE | Facility: CLINIC | Age: 64
End: 2019-05-28

## 2019-05-28 ENCOUNTER — COMMUNICATION - HEALTHEAST (OUTPATIENT)
Dept: ANTICOAGULATION | Facility: CLINIC | Age: 64
End: 2019-05-28

## 2019-05-28 DIAGNOSIS — D64.9 NORMOCHROMIC NORMOCYTIC ANEMIA: ICD-10-CM

## 2019-05-28 DIAGNOSIS — D68.51 FACTOR V LEIDEN (H): ICD-10-CM

## 2019-05-28 DIAGNOSIS — S52.532D CLOSED COLLES' FRACTURE OF LEFT RADIUS WITH ROUTINE HEALING, SUBSEQUENT ENCOUNTER: ICD-10-CM

## 2019-05-28 DIAGNOSIS — I82.409 DEEP VEIN THROMBOSIS (DVT) OF LOWER EXTREMITY (H): ICD-10-CM

## 2019-05-28 DIAGNOSIS — F10.20 ALCOHOLISM (H): ICD-10-CM

## 2019-05-28 DIAGNOSIS — Z86.718 HISTORY OF DVT (DEEP VEIN THROMBOSIS): ICD-10-CM

## 2019-05-28 LAB — INR PPP: 1.3 (ref 0.9–1.1)

## 2019-05-29 ENCOUNTER — RECORDS - HEALTHEAST (OUTPATIENT)
Dept: ADMINISTRATIVE | Facility: OTHER | Age: 64
End: 2019-05-29

## 2019-06-04 ENCOUNTER — AMBULATORY - HEALTHEAST (OUTPATIENT)
Dept: LAB | Facility: CLINIC | Age: 64
End: 2019-06-04

## 2019-06-04 ENCOUNTER — COMMUNICATION - HEALTHEAST (OUTPATIENT)
Dept: ANTICOAGULATION | Facility: CLINIC | Age: 64
End: 2019-06-04

## 2019-06-04 DIAGNOSIS — D68.51 FACTOR V LEIDEN (H): ICD-10-CM

## 2019-06-04 DIAGNOSIS — I82.409 DEEP VEIN THROMBOSIS (DVT) OF LOWER EXTREMITY (H): ICD-10-CM

## 2019-06-04 LAB — INR PPP: 1.9 (ref 0.9–1.1)

## 2019-06-05 ENCOUNTER — COMMUNICATION - HEALTHEAST (OUTPATIENT)
Dept: ANTICOAGULATION | Facility: CLINIC | Age: 64
End: 2019-06-05

## 2019-06-05 ENCOUNTER — AMBULATORY - HEALTHEAST (OUTPATIENT)
Dept: LAB | Facility: CLINIC | Age: 64
End: 2019-06-05

## 2019-06-05 DIAGNOSIS — I82.409 DEEP VEIN THROMBOSIS (DVT) OF LOWER EXTREMITY (H): ICD-10-CM

## 2019-06-05 DIAGNOSIS — D68.51 FACTOR V LEIDEN (H): ICD-10-CM

## 2019-06-05 LAB — INR PPP: 2.6 (ref 0.9–1.1)

## 2019-06-07 ENCOUNTER — COMMUNICATION - HEALTHEAST (OUTPATIENT)
Dept: SCHEDULING | Facility: CLINIC | Age: 64
End: 2019-06-07

## 2019-06-14 ENCOUNTER — COMMUNICATION - HEALTHEAST (OUTPATIENT)
Dept: ANTICOAGULATION | Facility: CLINIC | Age: 64
End: 2019-06-14

## 2019-06-14 ENCOUNTER — AMBULATORY - HEALTHEAST (OUTPATIENT)
Dept: LAB | Facility: CLINIC | Age: 64
End: 2019-06-14

## 2019-06-14 DIAGNOSIS — I82.409 DEEP VEIN THROMBOSIS (DVT) OF LOWER EXTREMITY (H): ICD-10-CM

## 2019-06-14 DIAGNOSIS — D68.51 FACTOR V LEIDEN (H): ICD-10-CM

## 2019-06-14 LAB — INR PPP: 2.9 (ref 0.9–1.1)

## 2019-06-26 ENCOUNTER — RECORDS - HEALTHEAST (OUTPATIENT)
Dept: ADMINISTRATIVE | Facility: OTHER | Age: 64
End: 2019-06-26

## 2019-07-05 ENCOUNTER — COMMUNICATION - HEALTHEAST (OUTPATIENT)
Dept: ANTICOAGULATION | Facility: CLINIC | Age: 64
End: 2019-07-05

## 2019-08-15 ENCOUNTER — COMMUNICATION - HEALTHEAST (OUTPATIENT)
Dept: ANTICOAGULATION | Facility: CLINIC | Age: 64
End: 2019-08-15

## 2019-08-15 ENCOUNTER — AMBULATORY - HEALTHEAST (OUTPATIENT)
Dept: LAB | Facility: CLINIC | Age: 64
End: 2019-08-15

## 2019-08-15 DIAGNOSIS — D68.51 FACTOR V LEIDEN (H): ICD-10-CM

## 2019-08-15 DIAGNOSIS — I82.409 DEEP VEIN THROMBOSIS (DVT) OF LOWER EXTREMITY (H): ICD-10-CM

## 2019-08-15 LAB — INR PPP: 2.5 (ref 0.9–1.1)

## 2019-09-19 ENCOUNTER — COMMUNICATION - HEALTHEAST (OUTPATIENT)
Dept: ANTICOAGULATION | Facility: CLINIC | Age: 64
End: 2019-09-19

## 2019-10-10 ENCOUNTER — COMMUNICATION - HEALTHEAST (OUTPATIENT)
Dept: ANTICOAGULATION | Facility: CLINIC | Age: 64
End: 2019-10-10

## 2019-10-10 ENCOUNTER — OFFICE VISIT - HEALTHEAST (OUTPATIENT)
Dept: FAMILY MEDICINE | Facility: CLINIC | Age: 64
End: 2019-10-10

## 2019-10-10 DIAGNOSIS — D68.51 FACTOR V LEIDEN (H): ICD-10-CM

## 2019-10-10 DIAGNOSIS — J34.9 PARANASAL SINUS DISEASE: ICD-10-CM

## 2019-10-10 DIAGNOSIS — I83.891 SYMPTOMATIC VARICOSE VEINS OF RIGHT LOWER EXTREMITY: ICD-10-CM

## 2019-10-10 DIAGNOSIS — Z12.31 ENCOUNTER FOR SCREENING MAMMOGRAM FOR BREAST CANCER: ICD-10-CM

## 2019-10-10 DIAGNOSIS — Z71.6 TOBACCO ABUSE COUNSELING: ICD-10-CM

## 2019-10-10 DIAGNOSIS — H90.6 MIXED CONDUCTIVE AND SENSORINEURAL HEARING LOSS OF BOTH EARS: ICD-10-CM

## 2019-10-10 DIAGNOSIS — Z13.220 LIPID SCREENING: ICD-10-CM

## 2019-10-10 DIAGNOSIS — I82.409 DEEP VEIN THROMBOSIS (DVT) OF LOWER EXTREMITY (H): ICD-10-CM

## 2019-10-10 DIAGNOSIS — R60.0 PEDAL EDEMA: ICD-10-CM

## 2019-10-10 LAB — INR PPP: 2.8 (ref 0.9–1.1)

## 2019-10-10 ASSESSMENT — PATIENT HEALTH QUESTIONNAIRE - PHQ9: SUM OF ALL RESPONSES TO PHQ QUESTIONS 1-9: 4

## 2019-10-14 ENCOUNTER — COMMUNICATION - HEALTHEAST (OUTPATIENT)
Dept: ANTICOAGULATION | Facility: CLINIC | Age: 64
End: 2019-10-14

## 2019-10-14 DIAGNOSIS — D68.51 FACTOR V LEIDEN (H): ICD-10-CM

## 2019-10-14 DIAGNOSIS — Z86.718 HISTORY OF DVT (DEEP VEIN THROMBOSIS): ICD-10-CM

## 2019-10-30 ENCOUNTER — OFFICE VISIT - HEALTHEAST (OUTPATIENT)
Dept: AUDIOLOGY | Facility: CLINIC | Age: 64
End: 2019-10-30

## 2019-10-30 DIAGNOSIS — H90.3 SENSORINEURAL HEARING LOSS, BILATERAL: ICD-10-CM

## 2019-10-30 DIAGNOSIS — H93.8X3 SENSATION OF FULLNESS IN BOTH EARS: ICD-10-CM

## 2019-10-31 ENCOUNTER — COMMUNICATION - HEALTHEAST (OUTPATIENT)
Dept: FAMILY MEDICINE | Facility: CLINIC | Age: 64
End: 2019-10-31

## 2019-10-31 DIAGNOSIS — D68.2 FACTOR V DEFICIENCY (H): ICD-10-CM

## 2019-10-31 DIAGNOSIS — Z86.718 HISTORY OF DVT (DEEP VEIN THROMBOSIS): ICD-10-CM

## 2019-11-08 NOTE — TELEPHONE ENCOUNTER
Patient in bed shaking with shallow breathing.  States this is usual for her when in the hospital. Prerna started with a cold on Feb 14th, sore throat.  For the past three days  a harsh non productive cough and chest pain when coughing.    Low grade fever off and non.  Coughing is keeping Prerna up at night and is prone to sinus infections.  Prerna is using mucinex, tylenol flu and cold.

## 2019-11-12 ENCOUNTER — OFFICE VISIT - HEALTHEAST (OUTPATIENT)
Dept: VASCULAR SURGERY | Facility: CLINIC | Age: 64
End: 2019-11-12

## 2019-11-12 DIAGNOSIS — I83.893 SYMPTOMATIC VARICOSE VEINS OF BOTH LOWER EXTREMITIES: ICD-10-CM

## 2019-11-12 DIAGNOSIS — I87.2 VENOUS INSUFFICIENCY OF BOTH LOWER EXTREMITIES: ICD-10-CM

## 2019-11-12 ASSESSMENT — MIFFLIN-ST. JEOR: SCORE: 1449

## 2019-11-14 ENCOUNTER — COMMUNICATION - HEALTHEAST (OUTPATIENT)
Dept: ANTICOAGULATION | Facility: CLINIC | Age: 64
End: 2019-11-14

## 2019-11-20 ENCOUNTER — COMMUNICATION - HEALTHEAST (OUTPATIENT)
Dept: FAMILY MEDICINE | Facility: CLINIC | Age: 64
End: 2019-11-20

## 2019-11-20 ENCOUNTER — OFFICE VISIT - HEALTHEAST (OUTPATIENT)
Dept: VASCULAR SURGERY | Facility: CLINIC | Age: 64
End: 2019-11-20

## 2019-11-20 ENCOUNTER — RECORDS - HEALTHEAST (OUTPATIENT)
Dept: VASCULAR ULTRASOUND | Facility: CLINIC | Age: 64
End: 2019-11-20

## 2019-11-20 DIAGNOSIS — I83.893 SYMPTOMATIC VARICOSE VEINS OF BOTH LOWER EXTREMITIES: ICD-10-CM

## 2019-11-20 DIAGNOSIS — I87.2 VENOUS INSUFFICIENCY OF BOTH LOWER EXTREMITIES: ICD-10-CM

## 2019-11-20 DIAGNOSIS — I83.893 VARICOSE VEINS OF BILATERAL LOWER EXTREMITIES WITH OTHER COMPLICATIONS: ICD-10-CM

## 2019-11-20 DIAGNOSIS — I87.2 VENOUS INSUFFICIENCY (CHRONIC) (PERIPHERAL): ICD-10-CM

## 2019-11-25 ENCOUNTER — COMMUNICATION - HEALTHEAST (OUTPATIENT)
Dept: VASCULAR SURGERY | Facility: CLINIC | Age: 64
End: 2019-11-25

## 2019-11-27 ENCOUNTER — COMMUNICATION - HEALTHEAST (OUTPATIENT)
Dept: FAMILY MEDICINE | Facility: CLINIC | Age: 64
End: 2019-11-27

## 2019-11-27 ENCOUNTER — COMMUNICATION - HEALTHEAST (OUTPATIENT)
Dept: VASCULAR SURGERY | Facility: CLINIC | Age: 64
End: 2019-11-27

## 2019-11-27 DIAGNOSIS — D68.51 FACTOR V LEIDEN (H): ICD-10-CM

## 2019-12-05 ENCOUNTER — RECORDS - HEALTHEAST (OUTPATIENT)
Dept: VASCULAR ULTRASOUND | Facility: CLINIC | Age: 64
End: 2019-12-05

## 2019-12-05 DIAGNOSIS — I87.2 VENOUS INSUFFICIENCY (CHRONIC) (PERIPHERAL): ICD-10-CM

## 2019-12-05 DIAGNOSIS — I83.893 VARICOSE VEINS OF BILATERAL LOWER EXTREMITIES WITH OTHER COMPLICATIONS: ICD-10-CM

## 2019-12-06 ENCOUNTER — COMMUNICATION - HEALTHEAST (OUTPATIENT)
Dept: FAMILY MEDICINE | Facility: CLINIC | Age: 64
End: 2019-12-06

## 2019-12-06 ENCOUNTER — AMBULATORY - HEALTHEAST (OUTPATIENT)
Dept: LAB | Facility: CLINIC | Age: 64
End: 2019-12-06

## 2019-12-06 ENCOUNTER — COMMUNICATION - HEALTHEAST (OUTPATIENT)
Dept: ANTICOAGULATION | Facility: CLINIC | Age: 64
End: 2019-12-06

## 2019-12-06 DIAGNOSIS — Z86.718 HISTORY OF DVT (DEEP VEIN THROMBOSIS): ICD-10-CM

## 2019-12-06 DIAGNOSIS — D68.51 FACTOR V LEIDEN (H): ICD-10-CM

## 2019-12-06 LAB — INR PPP: 1.3 (ref 0.9–1.1)

## 2019-12-08 ENCOUNTER — NURSE TRIAGE (OUTPATIENT)
Dept: NURSING | Facility: CLINIC | Age: 64
End: 2019-12-08

## 2019-12-08 ENCOUNTER — COMMUNICATION - HEALTHEAST (OUTPATIENT)
Dept: SCHEDULING | Facility: CLINIC | Age: 64
End: 2019-12-08

## 2019-12-08 NOTE — TELEPHONE ENCOUNTER
After verifying patient's demographics it was noted that demographic information is very old.  Patient states her PCP is KEEGAN Napoles at [facility].  FNA transferred patient to Health system Nurse Advisor Line.

## 2019-12-09 ENCOUNTER — COMMUNICATION - HEALTHEAST (OUTPATIENT)
Dept: VASCULAR SURGERY | Facility: CLINIC | Age: 64
End: 2019-12-09

## 2019-12-09 DIAGNOSIS — I87.2 VENOUS INSUFFICIENCY OF BOTH LOWER EXTREMITIES: ICD-10-CM

## 2019-12-09 DIAGNOSIS — L03.119 CELLULITIS OF LOWER EXTREMITY, UNSPECIFIED LATERALITY: ICD-10-CM

## 2019-12-10 ENCOUNTER — AMBULATORY - HEALTHEAST (OUTPATIENT)
Dept: LAB | Facility: CLINIC | Age: 64
End: 2019-12-10

## 2019-12-10 ENCOUNTER — COMMUNICATION - HEALTHEAST (OUTPATIENT)
Dept: ANTICOAGULATION | Facility: CLINIC | Age: 64
End: 2019-12-10

## 2019-12-10 DIAGNOSIS — D68.51 FACTOR V LEIDEN (H): ICD-10-CM

## 2019-12-10 DIAGNOSIS — Z86.718 HISTORY OF DVT (DEEP VEIN THROMBOSIS): ICD-10-CM

## 2019-12-10 LAB — INR PPP: 2.9 (ref 0.9–1.1)

## 2019-12-12 ENCOUNTER — HOSPITAL ENCOUNTER (OUTPATIENT)
Dept: MAMMOGRAPHY | Facility: CLINIC | Age: 64
Discharge: HOME | End: 2019-12-12
Attending: FAMILY MEDICINE

## 2019-12-12 DIAGNOSIS — Z12.31 ENCOUNTER FOR SCREENING MAMMOGRAM FOR BREAST CANCER: ICD-10-CM

## 2019-12-17 ENCOUNTER — AMBULATORY - HEALTHEAST (OUTPATIENT)
Dept: LAB | Facility: CLINIC | Age: 64
End: 2019-12-17

## 2019-12-17 ENCOUNTER — COMMUNICATION - HEALTHEAST (OUTPATIENT)
Dept: ANTICOAGULATION | Facility: CLINIC | Age: 64
End: 2019-12-17

## 2019-12-17 ENCOUNTER — OFFICE VISIT - HEALTHEAST (OUTPATIENT)
Dept: VASCULAR SURGERY | Facility: CLINIC | Age: 64
End: 2019-12-17

## 2019-12-17 DIAGNOSIS — I83.893 SYMPTOMATIC VARICOSE VEINS OF BOTH LOWER EXTREMITIES: ICD-10-CM

## 2019-12-17 DIAGNOSIS — Z86.718 HISTORY OF DVT (DEEP VEIN THROMBOSIS): ICD-10-CM

## 2019-12-17 DIAGNOSIS — D68.51 FACTOR V LEIDEN (H): ICD-10-CM

## 2019-12-17 DIAGNOSIS — I87.2 VENOUS INSUFFICIENCY OF BOTH LOWER EXTREMITIES: ICD-10-CM

## 2019-12-17 LAB — INR PPP: 2.3 (ref 0.9–1.1)

## 2019-12-17 ASSESSMENT — MIFFLIN-ST. JEOR: SCORE: 1449

## 2020-01-07 ENCOUNTER — COMMUNICATION - HEALTHEAST (OUTPATIENT)
Dept: ANTICOAGULATION | Facility: CLINIC | Age: 65
End: 2020-01-07

## 2020-01-10 ENCOUNTER — COMMUNICATION - HEALTHEAST (OUTPATIENT)
Dept: FAMILY MEDICINE | Facility: CLINIC | Age: 65
End: 2020-01-10

## 2020-01-10 DIAGNOSIS — Z71.6 TOBACCO ABUSE COUNSELING: ICD-10-CM

## 2020-02-06 ENCOUNTER — AMBULATORY - HEALTHEAST (OUTPATIENT)
Dept: LAB | Facility: CLINIC | Age: 65
End: 2020-02-06

## 2020-02-06 ENCOUNTER — COMMUNICATION - HEALTHEAST (OUTPATIENT)
Dept: ANTICOAGULATION | Facility: CLINIC | Age: 65
End: 2020-02-06

## 2020-02-06 DIAGNOSIS — D68.51 FACTOR V LEIDEN (H): ICD-10-CM

## 2020-02-06 DIAGNOSIS — Z86.718 HISTORY OF DVT (DEEP VEIN THROMBOSIS): ICD-10-CM

## 2020-02-06 LAB — INR PPP: 2.8 (ref 0.9–1.1)

## 2020-02-15 ENCOUNTER — COMMUNICATION - HEALTHEAST (OUTPATIENT)
Dept: FAMILY MEDICINE | Facility: CLINIC | Age: 65
End: 2020-02-15

## 2020-02-15 DIAGNOSIS — G47.00 INSOMNIA: ICD-10-CM

## 2020-02-15 DIAGNOSIS — F33.1 MODERATE EPISODE OF RECURRENT MAJOR DEPRESSIVE DISORDER (H): ICD-10-CM

## 2020-03-12 ENCOUNTER — COMMUNICATION - HEALTHEAST (OUTPATIENT)
Dept: ANTICOAGULATION | Facility: CLINIC | Age: 65
End: 2020-03-12

## 2020-03-26 ENCOUNTER — COMMUNICATION - HEALTHEAST (OUTPATIENT)
Dept: FAMILY MEDICINE | Facility: CLINIC | Age: 65
End: 2020-03-26

## 2020-03-26 DIAGNOSIS — Z71.6 TOBACCO ABUSE COUNSELING: ICD-10-CM

## 2020-04-17 ENCOUNTER — OFFICE VISIT - HEALTHEAST (OUTPATIENT)
Dept: FAMILY MEDICINE | Facility: CLINIC | Age: 65
End: 2020-04-17

## 2020-04-17 DIAGNOSIS — G47.00 INSOMNIA: ICD-10-CM

## 2020-04-17 DIAGNOSIS — F32.2 SEVERE MAJOR DEPRESSION (H): ICD-10-CM

## 2020-04-17 ASSESSMENT — ANXIETY QUESTIONNAIRES
2. NOT BEING ABLE TO STOP OR CONTROL WORRYING: NEARLY EVERY DAY
GAD7 TOTAL SCORE: 15
5. BEING SO RESTLESS THAT IT IS HARD TO SIT STILL: NEARLY EVERY DAY
7. FEELING AFRAID AS IF SOMETHING AWFUL MIGHT HAPPEN: NOT AT ALL
4. TROUBLE RELAXING: MORE THAN HALF THE DAYS
3. WORRYING TOO MUCH ABOUT DIFFERENT THINGS: NEARLY EVERY DAY
1. FEELING NERVOUS, ANXIOUS, OR ON EDGE: NEARLY EVERY DAY
6. BECOMING EASILY ANNOYED OR IRRITABLE: SEVERAL DAYS

## 2020-04-17 ASSESSMENT — PATIENT HEALTH QUESTIONNAIRE - PHQ9: SUM OF ALL RESPONSES TO PHQ QUESTIONS 1-9: 21

## 2020-04-30 ENCOUNTER — COMMUNICATION - HEALTHEAST (OUTPATIENT)
Dept: FAMILY MEDICINE | Facility: CLINIC | Age: 65
End: 2020-04-30

## 2020-04-30 DIAGNOSIS — D68.2 FACTOR V DEFICIENCY (H): ICD-10-CM

## 2020-04-30 DIAGNOSIS — Z86.718 HISTORY OF DVT (DEEP VEIN THROMBOSIS): ICD-10-CM

## 2020-06-23 ENCOUNTER — COMMUNICATION - HEALTHEAST (OUTPATIENT)
Dept: FAMILY MEDICINE | Facility: CLINIC | Age: 65
End: 2020-06-23

## 2020-06-23 DIAGNOSIS — F32.2 SEVERE MAJOR DEPRESSION (H): ICD-10-CM

## 2020-07-09 ENCOUNTER — COMMUNICATION - HEALTHEAST (OUTPATIENT)
Dept: PULMONOLOGY | Facility: OTHER | Age: 65
End: 2020-07-09

## 2020-07-26 ENCOUNTER — COMMUNICATION - HEALTHEAST (OUTPATIENT)
Dept: FAMILY MEDICINE | Facility: CLINIC | Age: 65
End: 2020-07-26

## 2020-07-26 DIAGNOSIS — G47.00 INSOMNIA: ICD-10-CM

## 2020-07-28 ENCOUNTER — VIRTUAL VISIT (OUTPATIENT)
Dept: FAMILY MEDICINE | Facility: OTHER | Age: 65
End: 2020-07-28
Payer: COMMERCIAL

## 2020-07-28 PROCEDURE — 99421 OL DIG E/M SVC 5-10 MIN: CPT | Performed by: FAMILY MEDICINE

## 2020-07-29 ENCOUNTER — AMBULATORY - HEALTHEAST (OUTPATIENT)
Dept: FAMILY MEDICINE | Facility: CLINIC | Age: 65
End: 2020-07-29

## 2020-07-29 ENCOUNTER — OFFICE VISIT - HEALTHEAST (OUTPATIENT)
Dept: FAMILY MEDICINE | Facility: CLINIC | Age: 65
End: 2020-07-29

## 2020-07-29 DIAGNOSIS — G47.00 INSOMNIA: ICD-10-CM

## 2020-07-29 DIAGNOSIS — F33.1 MODERATE EPISODE OF RECURRENT MAJOR DEPRESSIVE DISORDER (H): ICD-10-CM

## 2020-07-29 DIAGNOSIS — Z20.822 SUSPECTED COVID-19 VIRUS INFECTION: ICD-10-CM

## 2020-07-29 DIAGNOSIS — F32.2 SEVERE MAJOR DEPRESSION (H): ICD-10-CM

## 2020-07-29 DIAGNOSIS — Z71.6 TOBACCO ABUSE COUNSELING: ICD-10-CM

## 2020-07-29 RX ORDER — VENLAFAXINE HYDROCHLORIDE 37.5 MG/1
CAPSULE, EXTENDED RELEASE ORAL
Qty: 180 CAPSULE | Refills: 1 | Status: SHIPPED | OUTPATIENT
Start: 2020-07-29 | End: 2021-07-13

## 2020-07-29 NOTE — PROGRESS NOTES
"Date: 2020 16:20:01  Clinician: Narendra Madison  Clinician NPI: [id number]  Patient: Prerna Pizarro  Patient : 1955  Patient Address[facility] [place] [id number]  Patient Phone: [phone]  Visit Protocol: URI  Patient Summary:  Prerna is a 64 year old ( : 1955 ) female who initiated a Visit for COVID-19 (Coronavirus) evaluation and screening. When asked the question \"Please sign me up to receive news, health information and promotions from [employer].\", Prerna responded \"No\".    Prerna states her symptoms started gradually 3-4 days ago. After her symptoms started, they improved and then got worse again.   Her symptoms consist of wheezing, nausea, diarrhea, myalgia, a sore throat, a cough, malaise, and a headache. Prerna also feels feverish.   Symptom details     Cough: Prerna coughs a few times an hour and her cough is not more bothersome at night. Phlegm does not come into her throat when she coughs. She does not believe her cough is caused by post-nasal drip.     Sore throat: Prerna reports having moderate throat pain (4-6 on a 10 point pain scale), does not have exudate on her tonsils, and can swallow liquids. She is not sure if the lymph nodes in her neck are enlarged. A rash has not appeared on the skin since the sore throat started.     Temperature: Her current temperature is 100.7 degrees Fahrenheit. Prerna has had a temperature over 100 degrees Fahrenheit for 1-2 days.     Wheezing: Prerna has not ever been diagnosed with asthma. Additional wheezing details as reported by the patient (free text): Just happens from time to time, not constant       Headache: She states the headache is moderate (4-6 on a 10 point pain scale).      Prerna denies having teeth pain, ageusia, anosmia, facial pain or pressure, nasal congestion, vomiting, rhinitis, ear pain, and chills. She also denies having recent facial or sinus surgery in the past 60 days and taking antibiotic medication in the past month. She is not " experiencing dyspnea.   Precipitating events  Within the past week, Prerna has not been exposed to someone with strep throat. She has not recently been exposed to someone with influenza. Prerna has not been in close contact with any high risk individuals.   Pertinent COVID-19 (Coronavirus) information  In the past 14 days, Prerna has not worked in a congregate living setting.   She does not work or volunteer as healthcare worker or a  and does not work or volunteer in a healthcare facility.   Prerna also has not lived in a congregate living setting in the past 14 days. She does not live with a healthcare worker.   Prerna has not had a close contact with a laboratory-confirmed COVID-19 patient within 14 days of symptom onset.   Pertinent medical history  Prerna had 1 sinus infection within the past year.   Prerna does not get yeast infections when she takes antibiotics.   Prerna does not need a return to work/school note.   Weight: 220 lbs   Prerna smokes or uses smokeless tobacco.   Additional information as reported by the patient (free text): Diagnosed with Factor V Leiden for blood clots   Weight: 220 lbs    MEDICATIONS: Centrum Silver oral, [employer] Allergy oral, trazodone oral, venlafaxine oral, bupropion HCl oral, warfarin oral, ALLERGIES: Iodine and Iodide Containing Products, Tetracyclines  Clinician Response:  Dear Prerna,   Your symptoms show that you may have coronavirus (COVID-19). This illness can cause fever, cough and trouble breathing. Many people get a mild case and get better on their own. Some people can get very sick.  What should I do?  We would like to test you for this virus.   1. Please call [phone] to schedule your visit. Explain that you were referred by [facility] to have a COVID-19 test. Be ready to share your [facility] visit ID number.  The following will serve as your written order for this COVID Test, ordered by me, for the indication of suspected COVID [Z20.828]: The test will be ordered in  "[facility], our electronic health record, after you are scheduled. It will show as ordered and authorized by Stephen Hickman MD.  Order: COVID-19 (Coronavirus) PCR for SYMPTOMATIC testing from [facility].      2. When it's time for your COVID test:  Stay at least 6 feet away from others. (If someone will drive you to your test, stay in the backseat, as far away from the  as you can.)   Cover your mouth and nose with a mask, tissue or washcloth.  Go straight to the testing site. Don't make any stops on the way there or back.      3.Starting now: Stay home and away from others (self-isolate) until:   You've had no fever---and no medicine that reduces fever---for 3 full days (72 hours). And...   Your other symptoms have gotten better. For example, your cough or breathing has improved. And...   At least 10 days have passed since your symptoms started.       During this time, don't leave the house except for testing or medical care.   Stay in your own room, even for meals. Use your own bathroom if you can.   Stay away from others in your home. No hugging, kissing or shaking hands. No visitors.  Don't go to work, school or anywhere else.    Clean \"high touch\" surfaces often (doorknobs, counters, handles, etc.). Use a household cleaning spray or wipes. You'll find a full list of  on the EPA website: www.epa.gov/pesticide-registration/list-n-disinfectants-use-against-sars-cov-2.   Cover your mouth and nose with a mask, tissue or washcloth to avoid spreading germs.  Wash your hands and face often. Use soap and water.  Caregivers in these groups are at risk for severe illness due to COVID-19:  o People 65 years and older  o People who live in a nursing home or long-term care facility  o People with chronic disease (lung, heart, cancer, diabetes, kidney, liver, immunologic)  o People who have a weakened immune system, including those who:   Are in cancer treatment  Take medicine that weakens the immune system, such as " corticosteroids  Had a bone marrow or organ transplant  Have an immune deficiency  Have poorly controlled HIV or AIDS  Are obese (body mass index of 40 or higher)  Smoke regularly   o Caregivers should wear gloves while washing dishes, handling laundry and cleaning bedrooms and bathrooms.  o Use caution when washing and drying laundry: Don't shake dirty laundry, and use the warmest water setting that you can.  o For more tips, go to www.cdc.gov/coronavirus/2019-ncov/downloads/10Things.pdf.    4.Sign up for [employer]. We know it's scary to hear that you might have COVID-19. We want to track your symptoms to make sure you're okay over the next 2 weeks. Please look for an email from [employer]---this is a free, online program that we'll use to keep in touch. To sign up, follow the link in the email. Learn more at [email or web address]  How can I take care of myself?   Get lots of rest. Drink extra fluids (unless a doctor has told you not to).   Take Tylenol (acetaminophen) for fever or pain. If you have liver or kidney problems, ask your family doctor if it's okay to take Tylenol.   Adults can take either:    650 mg (two 325 mg pills) every 4 to 6 hours, or...   1,000 mg (two 500 mg pills) every 8 hours as needed.    Note: Don't take more than 3,000 mg in one day. Acetaminophen is found in many medicines (both prescribed and over-the-counter medicines). Read all labels to be sure you don't take too much.   For children, check the Tylenol bottle for the right dose. The dose is based on the child's age or weight.    If you have other health problems (like cancer, heart failure, an organ transplant or severe kidney disease): Call your specialty clinic if you don't feel better in the next 2 days.       Know when to call 911. Emergency warning signs include:    Trouble breathing or shortness of breath Pain or pressure in the chest that doesn't go away Feeling confused like you haven't felt before, or not  being able to wake up Bluish-colored lips or face.  Where can I get more information?    [facility] -- About COVID-19: [email or web address]   CDC -- What to Do If You're Sick: www.cdc.gov/coronavirus/2019-ncov/about/steps-when-sick.html   [facility] -- Ending Home Isolation: www.cdc.gov/coronavirus/2019-ncov/hcp/disposition-in-home-patients.html   [facility] -- Caring for Someone: www.cdc.gov/coronavirus/2019-ncov/if-you-are-sick/care-for-someone.html   [facility] -- Interim Guidance for Hospital Discharge to Home: www.[facility].[place]./diseases/coronavirus/hcp/hospdischarge.pdf   [facility] clinical trials (COVID-19 research studies): clinicalaffairs.[facility]-clinical-trials    Below are the COVID-19 hotlines at the [place] [facility] ([facility]). Interpreters are available.    For health questions: Call 194-685-7130 or 1-913.974.3462 (7 a.m. to 7 p.m.) For questions about schools and childcare: Call 225-438-1115 or 1-590.390.2626 (7 a.m. to 7 p.m.)    Diagnosis: Cough  Diagnosis ICD: R05

## 2020-08-01 ENCOUNTER — COMMUNICATION - HEALTHEAST (OUTPATIENT)
Dept: SCHEDULING | Facility: CLINIC | Age: 65
End: 2020-08-01

## 2020-08-06 ENCOUNTER — COMMUNICATION - HEALTHEAST (OUTPATIENT)
Dept: PULMONOLOGY | Facility: OTHER | Age: 65
End: 2020-08-06

## 2020-10-20 ENCOUNTER — COMMUNICATION - HEALTHEAST (OUTPATIENT)
Dept: ANTICOAGULATION | Facility: CLINIC | Age: 65
End: 2020-10-20

## 2020-10-27 ENCOUNTER — COMMUNICATION - HEALTHEAST (OUTPATIENT)
Dept: ANTICOAGULATION | Facility: CLINIC | Age: 65
End: 2020-10-27

## 2020-10-27 ENCOUNTER — COMMUNICATION - HEALTHEAST (OUTPATIENT)
Dept: SCHEDULING | Facility: CLINIC | Age: 65
End: 2020-10-27

## 2020-10-27 ENCOUNTER — AMBULATORY - HEALTHEAST (OUTPATIENT)
Dept: LAB | Facility: CLINIC | Age: 65
End: 2020-10-27

## 2020-10-27 DIAGNOSIS — Z86.718 HISTORY OF DVT (DEEP VEIN THROMBOSIS): ICD-10-CM

## 2020-10-27 DIAGNOSIS — D68.51 FACTOR V LEIDEN (H): ICD-10-CM

## 2020-10-27 DIAGNOSIS — I82.409 DEEP VEIN THROMBOSIS (DVT) OF LOWER EXTREMITY (H): ICD-10-CM

## 2020-10-27 DIAGNOSIS — D68.2 FACTOR V DEFICIENCY (H): ICD-10-CM

## 2020-10-27 LAB — INR PPP: 4.2 (ref 0.9–1.1)

## 2020-11-12 ENCOUNTER — AMBULATORY - HEALTHEAST (OUTPATIENT)
Dept: LAB | Facility: CLINIC | Age: 65
End: 2020-11-12

## 2020-11-12 ENCOUNTER — COMMUNICATION - HEALTHEAST (OUTPATIENT)
Dept: ANTICOAGULATION | Facility: CLINIC | Age: 65
End: 2020-11-12

## 2020-11-12 DIAGNOSIS — D68.51 FACTOR V LEIDEN (H): ICD-10-CM

## 2020-11-12 DIAGNOSIS — Z86.718 HISTORY OF DVT (DEEP VEIN THROMBOSIS): ICD-10-CM

## 2020-11-12 DIAGNOSIS — I82.409 DEEP VEIN THROMBOSIS (DVT) OF LOWER EXTREMITY (H): ICD-10-CM

## 2020-11-12 LAB — INR PPP: 4 (ref 0.9–1.1)

## 2020-11-23 ENCOUNTER — COMMUNICATION - HEALTHEAST (OUTPATIENT)
Dept: ANTICOAGULATION | Facility: CLINIC | Age: 65
End: 2020-11-23

## 2020-11-25 ENCOUNTER — COMMUNICATION - HEALTHEAST (OUTPATIENT)
Dept: ANTICOAGULATION | Facility: CLINIC | Age: 65
End: 2020-11-25

## 2020-11-25 ENCOUNTER — AMBULATORY - HEALTHEAST (OUTPATIENT)
Dept: LAB | Facility: CLINIC | Age: 65
End: 2020-11-25

## 2020-11-25 ENCOUNTER — AMBULATORY - HEALTHEAST (OUTPATIENT)
Dept: ANTICOAGULATION | Facility: CLINIC | Age: 65
End: 2020-11-25

## 2020-11-25 DIAGNOSIS — I82.409 DEEP VEIN THROMBOSIS (DVT) OF LOWER EXTREMITY (H): ICD-10-CM

## 2020-11-25 DIAGNOSIS — D68.51 FACTOR V LEIDEN (H): ICD-10-CM

## 2020-11-25 DIAGNOSIS — Z86.718 HISTORY OF DVT (DEEP VEIN THROMBOSIS): ICD-10-CM

## 2020-11-25 LAB — INR PPP: 2.2 (ref 0.9–1.1)

## 2020-12-09 ENCOUNTER — OFFICE VISIT - HEALTHEAST (OUTPATIENT)
Dept: FAMILY MEDICINE | Facility: CLINIC | Age: 65
End: 2020-12-09

## 2020-12-09 ENCOUNTER — COMMUNICATION - HEALTHEAST (OUTPATIENT)
Dept: ANTICOAGULATION | Facility: CLINIC | Age: 65
End: 2020-12-09

## 2020-12-09 DIAGNOSIS — Z86.718 HISTORY OF DVT (DEEP VEIN THROMBOSIS): ICD-10-CM

## 2020-12-09 DIAGNOSIS — L08.9 INFECTED SEBACEOUS CYST: ICD-10-CM

## 2020-12-09 DIAGNOSIS — D68.51 FACTOR V LEIDEN (H): ICD-10-CM

## 2020-12-09 DIAGNOSIS — I82.409 DEEP VEIN THROMBOSIS (DVT) OF LOWER EXTREMITY (H): ICD-10-CM

## 2020-12-09 DIAGNOSIS — L72.3 INFECTED SEBACEOUS CYST: ICD-10-CM

## 2020-12-09 LAB — INR PPP: 2.7 (ref 0.9–1.1)

## 2020-12-14 ENCOUNTER — OFFICE VISIT - HEALTHEAST (OUTPATIENT)
Dept: FAMILY MEDICINE | Facility: CLINIC | Age: 65
End: 2020-12-14

## 2020-12-14 ENCOUNTER — COMMUNICATION - HEALTHEAST (OUTPATIENT)
Dept: ANTICOAGULATION | Facility: CLINIC | Age: 65
End: 2020-12-14

## 2020-12-14 DIAGNOSIS — L72.3 INFECTED SEBACEOUS CYST: ICD-10-CM

## 2020-12-14 DIAGNOSIS — Z86.718 HISTORY OF DVT (DEEP VEIN THROMBOSIS): ICD-10-CM

## 2020-12-14 DIAGNOSIS — I82.409 DEEP VEIN THROMBOSIS (DVT) OF LOWER EXTREMITY (H): ICD-10-CM

## 2020-12-14 DIAGNOSIS — D68.51 FACTOR V LEIDEN (H): ICD-10-CM

## 2020-12-14 DIAGNOSIS — F33.1 MODERATE EPISODE OF RECURRENT MAJOR DEPRESSIVE DISORDER (H): ICD-10-CM

## 2020-12-14 DIAGNOSIS — L08.9 INFECTED SEBACEOUS CYST: ICD-10-CM

## 2020-12-14 LAB — INR PPP: 2.5 (ref 0.9–1.1)

## 2020-12-15 ENCOUNTER — OFFICE VISIT - HEALTHEAST (OUTPATIENT)
Dept: FAMILY MEDICINE | Facility: CLINIC | Age: 65
End: 2020-12-15

## 2020-12-15 DIAGNOSIS — L08.9 INFECTED SEBACEOUS CYST: ICD-10-CM

## 2020-12-15 DIAGNOSIS — L72.3 INFECTED SEBACEOUS CYST: ICD-10-CM

## 2020-12-15 RX ORDER — ACETAMINOPHEN AND CODEINE PHOSPHATE 300; 30 MG/1; MG/1
1 TABLET ORAL EVERY 6 HOURS PRN
Qty: 10 TABLET | Refills: 0 | Status: SHIPPED | OUTPATIENT
Start: 2020-12-15

## 2020-12-16 ENCOUNTER — COMMUNICATION - HEALTHEAST (OUTPATIENT)
Dept: HOME HEALTH SERVICES | Facility: HOME HEALTH | Age: 65
End: 2020-12-16

## 2020-12-16 ENCOUNTER — HOME CARE/HOSPICE - HEALTHEAST (OUTPATIENT)
Dept: HOME HEALTH SERVICES | Facility: HOME HEALTH | Age: 65
End: 2020-12-16

## 2020-12-16 LAB — BACTERIA SPEC CULT: NO GROWTH

## 2020-12-17 ENCOUNTER — OFFICE VISIT - HEALTHEAST (OUTPATIENT)
Dept: FAMILY MEDICINE | Facility: CLINIC | Age: 65
End: 2020-12-17

## 2020-12-17 DIAGNOSIS — L08.9 INFECTED SEBACEOUS CYST: ICD-10-CM

## 2020-12-17 DIAGNOSIS — L72.3 INFECTED SEBACEOUS CYST: ICD-10-CM

## 2020-12-29 ENCOUNTER — COMMUNICATION - HEALTHEAST (OUTPATIENT)
Dept: ANTICOAGULATION | Facility: CLINIC | Age: 65
End: 2020-12-29

## 2021-01-13 ENCOUNTER — AMBULATORY - HEALTHEAST (OUTPATIENT)
Dept: LAB | Facility: CLINIC | Age: 66
End: 2021-01-13

## 2021-01-13 ENCOUNTER — COMMUNICATION - HEALTHEAST (OUTPATIENT)
Dept: ANTICOAGULATION | Facility: CLINIC | Age: 66
End: 2021-01-13

## 2021-01-13 DIAGNOSIS — D68.51 FACTOR V LEIDEN (H): ICD-10-CM

## 2021-01-13 DIAGNOSIS — Z86.718 HISTORY OF DVT (DEEP VEIN THROMBOSIS): ICD-10-CM

## 2021-01-13 DIAGNOSIS — I82.409 DEEP VEIN THROMBOSIS (DVT) OF LOWER EXTREMITY (H): ICD-10-CM

## 2021-01-13 LAB — INR PPP: 3.9 (ref 0.9–1.1)

## 2021-01-22 ENCOUNTER — COMMUNICATION - HEALTHEAST (OUTPATIENT)
Dept: FAMILY MEDICINE | Facility: CLINIC | Age: 66
End: 2021-01-22

## 2021-01-22 DIAGNOSIS — F33.1 MODERATE EPISODE OF RECURRENT MAJOR DEPRESSIVE DISORDER (H): ICD-10-CM

## 2021-01-22 RX ORDER — BUPROPION HYDROCHLORIDE 300 MG/1
TABLET ORAL
Qty: 90 TABLET | Refills: 3 | Status: SHIPPED | OUTPATIENT
Start: 2021-01-22 | End: 2022-01-20

## 2021-01-28 ENCOUNTER — COMMUNICATION - HEALTHEAST (OUTPATIENT)
Dept: ANTICOAGULATION | Facility: CLINIC | Age: 66
End: 2021-01-28

## 2021-02-08 ENCOUNTER — COMMUNICATION - HEALTHEAST (OUTPATIENT)
Dept: FAMILY MEDICINE | Facility: CLINIC | Age: 66
End: 2021-02-08

## 2021-02-08 DIAGNOSIS — Z71.6 TOBACCO ABUSE COUNSELING: ICD-10-CM

## 2021-02-09 RX ORDER — VARENICLINE TARTRATE 1 MG/1
TABLET, FILM COATED ORAL
Qty: 180 TABLET | Refills: 1 | Status: SHIPPED | OUTPATIENT
Start: 2021-02-09

## 2021-02-10 ENCOUNTER — COMMUNICATION - HEALTHEAST (OUTPATIENT)
Dept: ANTICOAGULATION | Facility: CLINIC | Age: 66
End: 2021-02-10

## 2021-02-10 ENCOUNTER — AMBULATORY - HEALTHEAST (OUTPATIENT)
Dept: LAB | Facility: CLINIC | Age: 66
End: 2021-02-10

## 2021-02-10 DIAGNOSIS — D68.51 FACTOR V LEIDEN (H): ICD-10-CM

## 2021-02-10 DIAGNOSIS — Z86.718 HISTORY OF DVT (DEEP VEIN THROMBOSIS): ICD-10-CM

## 2021-02-10 DIAGNOSIS — I82.409 DEEP VEIN THROMBOSIS (DVT) OF LOWER EXTREMITY (H): ICD-10-CM

## 2021-02-10 LAB — INR PPP: 2.9 (ref 0.9–1.1)

## 2021-02-17 ENCOUNTER — AMBULATORY - HEALTHEAST (OUTPATIENT)
Dept: ANTICOAGULATION | Facility: CLINIC | Age: 66
End: 2021-02-17

## 2021-02-17 ENCOUNTER — COMMUNICATION - HEALTHEAST (OUTPATIENT)
Dept: ANTICOAGULATION | Facility: CLINIC | Age: 66
End: 2021-02-17

## 2021-02-17 DIAGNOSIS — D68.51 FACTOR V LEIDEN (H): ICD-10-CM

## 2021-03-03 ENCOUNTER — COMMUNICATION - HEALTHEAST (OUTPATIENT)
Dept: ANTICOAGULATION | Facility: CLINIC | Age: 66
End: 2021-03-03

## 2021-03-05 ENCOUNTER — COMMUNICATION - HEALTHEAST (OUTPATIENT)
Dept: ANTICOAGULATION | Facility: CLINIC | Age: 66
End: 2021-03-05

## 2021-03-05 ENCOUNTER — AMBULATORY - HEALTHEAST (OUTPATIENT)
Dept: LAB | Facility: CLINIC | Age: 66
End: 2021-03-05

## 2021-03-05 DIAGNOSIS — D68.51 FACTOR V LEIDEN (H): ICD-10-CM

## 2021-03-05 LAB — INR PPP: 4.5 (ref 0.9–1.1)

## 2021-03-09 ENCOUNTER — COMMUNICATION - HEALTHEAST (OUTPATIENT)
Dept: FAMILY MEDICINE | Facility: CLINIC | Age: 66
End: 2021-03-09

## 2021-03-12 ENCOUNTER — AMBULATORY - HEALTHEAST (OUTPATIENT)
Dept: LAB | Facility: CLINIC | Age: 66
End: 2021-03-12

## 2021-03-12 ENCOUNTER — COMMUNICATION - HEALTHEAST (OUTPATIENT)
Dept: ANTICOAGULATION | Facility: CLINIC | Age: 66
End: 2021-03-12

## 2021-03-12 DIAGNOSIS — D68.51 FACTOR V LEIDEN (H): ICD-10-CM

## 2021-03-12 LAB — INR PPP: 2.2 (ref 0.9–1.1)

## 2021-03-19 ENCOUNTER — AMBULATORY - HEALTHEAST (OUTPATIENT)
Dept: NURSING | Facility: CLINIC | Age: 66
End: 2021-03-19

## 2021-04-02 ENCOUNTER — COMMUNICATION - HEALTHEAST (OUTPATIENT)
Dept: ANTICOAGULATION | Facility: CLINIC | Age: 66
End: 2021-04-02

## 2021-04-09 ENCOUNTER — COMMUNICATION - HEALTHEAST (OUTPATIENT)
Dept: ANTICOAGULATION | Facility: CLINIC | Age: 66
End: 2021-04-09

## 2021-04-09 ENCOUNTER — AMBULATORY - HEALTHEAST (OUTPATIENT)
Dept: NURSING | Facility: CLINIC | Age: 66
End: 2021-04-09

## 2021-04-14 ENCOUNTER — COMMUNICATION - HEALTHEAST (OUTPATIENT)
Dept: ANTICOAGULATION | Facility: CLINIC | Age: 66
End: 2021-04-14

## 2021-04-14 ENCOUNTER — AMBULATORY - HEALTHEAST (OUTPATIENT)
Dept: LAB | Facility: CLINIC | Age: 66
End: 2021-04-14

## 2021-04-14 DIAGNOSIS — D68.51 FACTOR V LEIDEN (H): ICD-10-CM

## 2021-04-14 LAB — INR PPP: 2.7 (ref 0.9–1.1)

## 2021-04-29 ENCOUNTER — COMMUNICATION - HEALTHEAST (OUTPATIENT)
Dept: FAMILY MEDICINE | Facility: CLINIC | Age: 66
End: 2021-04-29

## 2021-04-29 DIAGNOSIS — Z86.718 HISTORY OF DVT (DEEP VEIN THROMBOSIS): ICD-10-CM

## 2021-04-29 DIAGNOSIS — D68.2 FACTOR V DEFICIENCY (H): ICD-10-CM

## 2021-04-29 DIAGNOSIS — G47.00 INSOMNIA: ICD-10-CM

## 2021-04-29 RX ORDER — TRAZODONE HYDROCHLORIDE 50 MG/1
50 TABLET, FILM COATED ORAL AT BEDTIME
Qty: 90 TABLET | Refills: 3 | Status: SHIPPED | OUTPATIENT
Start: 2021-04-29

## 2021-04-29 RX ORDER — WARFARIN SODIUM 10 MG/1
TABLET ORAL
Qty: 90 TABLET | Refills: 1 | Status: SHIPPED | OUTPATIENT
Start: 2021-04-29 | End: 2021-07-13

## 2021-04-30 ENCOUNTER — AMBULATORY - HEALTHEAST (OUTPATIENT)
Dept: LAB | Facility: CLINIC | Age: 66
End: 2021-04-30

## 2021-04-30 ENCOUNTER — COMMUNICATION - HEALTHEAST (OUTPATIENT)
Dept: ANTICOAGULATION | Facility: CLINIC | Age: 66
End: 2021-04-30

## 2021-04-30 DIAGNOSIS — D68.51 FACTOR V LEIDEN (H): ICD-10-CM

## 2021-04-30 LAB — INR PPP: 2.4 (ref 0.9–1.1)

## 2021-05-26 ENCOUNTER — RECORDS - HEALTHEAST (OUTPATIENT)
Dept: ADMINISTRATIVE | Facility: CLINIC | Age: 66
End: 2021-05-26

## 2021-05-26 VITALS
DIASTOLIC BLOOD PRESSURE: 70 MMHG | SYSTOLIC BLOOD PRESSURE: 118 MMHG | TEMPERATURE: 98.5 F | HEART RATE: 76 BPM | RESPIRATION RATE: 12 BRPM

## 2021-05-26 ASSESSMENT — PATIENT HEALTH QUESTIONNAIRE - PHQ9: SUM OF ALL RESPONSES TO PHQ QUESTIONS 1-9: 4

## 2021-05-27 ENCOUNTER — RECORDS - HEALTHEAST (OUTPATIENT)
Dept: ADMINISTRATIVE | Facility: CLINIC | Age: 66
End: 2021-05-27

## 2021-05-27 ASSESSMENT — PATIENT HEALTH QUESTIONNAIRE - PHQ9: SUM OF ALL RESPONSES TO PHQ QUESTIONS 1-9: 21

## 2021-05-27 NOTE — TELEPHONE ENCOUNTER
Test Results  Who is calling?:  Patient  Who ordered the test:  PCP  Type of test: Imaging  Date of test:  3/22/19 Writer read the following to patient:  Where was the test performed:  We are writing to inform you that your recent lung cancer screening CT shows no signs of lung cancer.    Here is some additional information regarding your screening.    Your lung cancer screening report, including any minor observations, has been sent to your health care provider. Your exam report and images will be kept on file as part of your health record and are available for your continuing care.    Although lung cancer screening using low-dose CT is effective at finding lung cancer early, it cannot find all lung cancers.  If you develop any symptoms such as shortness of breath, chest pain, or coughing up blood, please call your doctor right away.    The best way to prevent lung cancer is to not smoke.  You can find resources to help smokers kick the habit at your family clinic or at [facility] (7-400-628-PLAN).     Sincerely,    [facility])  Lung Cancer Screening Program    What are your questions/concerns?: None  Okay to leave a detailed message?:  No call back needed

## 2021-05-28 ENCOUNTER — RECORDS - HEALTHEAST (OUTPATIENT)
Dept: ADMINISTRATIVE | Facility: CLINIC | Age: 66
End: 2021-05-28

## 2021-05-28 ASSESSMENT — ANXIETY QUESTIONNAIRES: GAD7 TOTAL SCORE: 15

## 2021-05-28 NOTE — PROGRESS NOTES
Assessment/Plan:     1. Contusion of right lower extremity, initial encounter     2. Pain of right lower leg  HM2(CBC w/o Differential)    XR Tibia and Fibula Right   3. Factor V Leiden (H)  INR   4. Deep vein thrombosis (DVT) of lower extremity (H)  INR       Diagnoses and all orders for this visit:    Contusion of right lower extremity, initial encounter  X-ray performed in clinic of right tibia-fibula and was negative.  Symptoms consistent with right lower extremity contusion.  INR was checked and was in therapeutic range.  Hemogram showed normal hemoglobin.  At this time, recommend conservative cares including frequent icing, elevation and Tylenol as needed for discomfort.  Discussed keeping a close eye on symptoms and to present to walk-in clinic or emergency department over the weekend if notices increasing redness, warmth, bluish discoloration or significant paresthesias.    Pain of right lower leg  -     HM2(CBC w/o Differential)  -     XR Tibia and Fibula Right; Future; Expected date: 05/17/2019    Factor V Leiden (H)  -     INR    Deep vein thrombosis (DVT) of lower extremity (H)  -     INR             Subjective:      Prerna Pizarro is a 63 y.o. female who comes in today with concern about right leg pain.  She has history of factor V Leiden and history of DVT in the right lower extremity.  She is on warfarin.  Yesterday morning she slipped getting into her tub.  She hit the right shin on the side of the tub.  Since then she has had increasing discomfort in the entire right lower leg as well as swelling.  Has had difficulty with walking.  Feels a tight sensation.  She has also noticed a little bit of whitish discoloration in the medial part of her shin.  She has not noticed redness, warmth or numbness.  Became concerned and wanted it checked before the weekend as she wishes to avoid ending up in the emergency department.  She is otherwise feeling well.  Reviewed medications and allergies.  She is not  having any concerns with chest pain or dyspnea.  She is a smoker.  She has not tried any icing, elevation or pain relieving medication.  Review of systems is otherwise negative.    Current Outpatient Medications   Medication Sig Dispense Refill     buPROPion (WELLBUTRIN XL) 300 MG 24 hr tablet Take 1 tablet (300 mg total) by mouth daily. 90 tablet 3     citalopram (CELEXA) 40 MG tablet Take 1 tablet (40 mg total) by mouth daily. 90 tablet 3     multivitamin with minerals (THERA-M) 9 mg iron-400 mcg Tab tablet Take 1 tablet by mouth daily.       traZODone (DESYREL) 50 MG tablet Take 1 tablet (50 mg total) by mouth at bedtime. 90 tablet 3     varenicline (CHANTIX) 1 mg tablet Take 1 tab by mouth two times a day. Take after eating with a full glass of water. NOTE: Dispense as maintenance for refills only. 60 tablet 2     warfarin (COUMADIN) 10 MG tablet Take 10 mg by mouth at bedtime. Taking 10mg every night       NAPROXEN SODIUM (ALEVE ORAL) Take 220 mg by mouth daily as needed.        No current facility-administered medications for this visit.        Past Medical History, Family History, and Social History reviewed.  Past Medical History:   Diagnosis Date     Acute bronchitis      Anxiety      Black tarry stools      Depression      ETOH abuse      Factor V Leiden (H)      Ovarian mass      Seasonal allergies      Past Surgical History:   Procedure Laterality Date     GA APPENDECTOMY      Description: Appendectomy;  Recorded: 2011;     GA  DELIVERY ONLY      Description:  Section;  Recorded: 2011;     GA DILATION/CURETTAGE,DIAGNOSTIC      Description: Dilation And Curettage;  Recorded: 2011;     GA GASTRIC BYPASS,OBESE<150CM NII-EN-Y      Description: Gastric Surgery For Morbid Obesity Bypass With Nii-en-Y;  Recorded: 2011;     GA INCISE SPINAL CORD TRACT(S)      Description: Laminectomy With Myelotomy Cervical;  Recorded: 2011;     TRANSURETHRAL RESECTION OF BLADDER  TUMOR       Contrast [iohexol]; Diatrizoate meglumine; Iodine; and Tetracyclines  Family History   Problem Relation Age of Onset     Brain cancer Father      Cancer Sister         ovarian     Esophageal cancer Mother      Alcohol abuse Mother      Cancer Mother         throat     Alcohol abuse Brother      No Medical Problems Brother      No Medical Problems Brother      No Medical Problems Brother      Social History     Socioeconomic History     Marital status: Single     Spouse name: Not on file     Number of children: Not on file     Years of education: Not on file     Highest education level: Not on file   Occupational History     Not on file   Social Needs     Financial resource strain: Not on file     Food insecurity:     Worry: Not on file     Inability: Not on file     Transportation needs:     Medical: Not on file     Non-medical: Not on file   Tobacco Use     Smoking status: Former Smoker     Packs/day: 0.75     Smokeless tobacco: Never Used   Substance and Sexual Activity     Alcohol use: No     Comment: Recovering alcoholic. Last drink was 1/29/13     Drug use: No     Sexual activity: Not Currently     Partners: Male     Birth control/protection: Post-menopausal   Lifestyle     Physical activity:     Days per week: Not on file     Minutes per session: Not on file     Stress: Not on file   Relationships     Social connections:     Talks on phone: Not on file     Gets together: Not on file     Attends Muslim service: Not on file     Active member of club or organization: Not on file     Attends meetings of clubs or organizations: Not on file     Relationship status: Not on file     Intimate partner violence:     Fear of current or ex partner: Not on file     Emotionally abused: Not on file     Physically abused: Not on file     Forced sexual activity: Not on file   Other Topics Concern     Not on file   Social History Narrative     Not on file         Review of systems is as stated in HPI, and the  "remainder of the 10 system review is otherwise negative.    Objective:     Vitals:    05/17/19 1407   BP: 118/65   Patient Site: Left Arm   Patient Position: Sitting   Cuff Size: Adult Large   Pulse: 62   Temp: 97.9  F (36.6  C)   TempSrc: Oral   SpO2: 98%   Weight: 209 lb 7 oz (95 kg)   Height: 5' 2.5\" (1.588 m)    Body mass index is 37.7 kg/m .    General appearance: alert, appears stated age and cooperative  Lungs: clear to auscultation bilaterally  Heart: regular rate and rhythm, S1, S2 normal, no murmur, click, rub or gallop  Extremities: soft tissue swelling present right lower extremity, tenderness to palpation in right calf and over anterior right shin at site of contusion  Neurologic: Grossly normal    Recent Results (from the past 24 hour(s))   INR    Collection Time: 05/17/19  2:21 PM   Result Value Ref Range    INR 2.20 (H) 0.90 - 1.10   HM2(CBC w/o Differential)    Collection Time: 05/17/19  2:38 PM   Result Value Ref Range    WBC 4.7 4.0 - 11.0 thou/uL    RBC 4.37 3.80 - 5.40 mill/uL    Hemoglobin 13.4 12.0 - 16.0 g/dL    Hematocrit 40.8 35.0 - 47.0 %    MCV 93 80 - 100 fL    MCH 30.6 27.0 - 34.0 pg    MCHC 32.8 32.0 - 36.0 g/dL    RDW 13.4 11.0 - 14.5 %    Platelets 258 140 - 440 thou/uL    MPV 7.6 7.0 - 10.0 fL     Results: X-ray of right tib-fib was performed in clinic.  This was personally reviewed.  No fracture or bony abnormality.  Soft tissue swelling noted.    This note has been dictated using voice recognition software. Any grammatical or context distortions are unintentional and inherent to the the software.       "

## 2021-05-28 NOTE — TELEPHONE ENCOUNTER
ANTICOAGULATION  MANAGEMENT PROGRAM    Prerna Pizarro is overdue for INR check.  Reminder call made.    Left message for Prerna. If returning call, please schedule INR check as soon as possible.    Susan Estrada RN

## 2021-05-28 NOTE — TELEPHONE ENCOUNTER
ANTICOAGULATION  MANAGEMENT    Assessment     Today's INR result of 3.5 is Supratherapeutic (goal INR of 2.0-3.0)        Warfarin taken as previously instructed    Decreased appetite; having URI    Potential interaction between Levaquin and Prednisone and warfarin which may affect subsequent INRs- started today x5 days     Continues to tolerate warfarin with no reported s/s of bleeding or thromboembolism     Previous INR was Therapeutic    Plan:     Spoke with Prerna regarding INR result and instructed:     Warfarin Dosing Instructions:  Take lower dose of 5 mg today only then continue current warfarin dose 10 mg daily.    Instructed patient to follow up no later than: Mon 5/6. Appt made.     Education provided: importance of taking warfarin as instructed and potential interaction between warfarin and Prednisone and Levaquin    Prerna verbalizes understanding and agrees to warfarin dosing plan.    Instructed to call the [facility] for any changes, questions or concerns. (#[phone])   ?   Usman Covarrubias RN    Subjective/Objective:      Prerna Pizarro, a 63 y.o. female is on warfarin.     Prerna reports:     Home warfarin dose: verbally confirmed home dose with pt and updated on anticoagulation calendar     Missed doses: No     Medication changes:  Yes: starting Prednisone and Levaquin x5 days     S/S of bleeding or thromboembolism:  No     New Injury or illness:  Yes: URI     Changes in diet or alcohol consumption:  Yes: decreased appetite     Upcoming surgery, procedure or cardioversion:  No    Anticoagulation Episode Summary     Current INR goal:   2.0-3.0   TTR:   75.7 % (7.2 mo)   Next INR check:   5/6/2019   INR from last check:   3.50! (5/2/2019)   Weekly max warfarin dose:      Target end date:      INR check location:      Preferred lab:      Send INR reminders to:   ANTICOAGULATION POOL B (MPW,HUG,STW,RVL,OAK,RLN)    Indications    Factor V Leiden (H) [D68.51]           Comments:             Anticoagulation Care Providers     Provider Role Specialty Phone number    Kenya Napoles MD Referring Family Medicine [phone]

## 2021-05-28 NOTE — TELEPHONE ENCOUNTER
ANTICOAGULATION  MANAGEMENT PROGRAM    Prerna Pizarro is overdue for INR check.  Reminder call made.    Left message for Prerna. If returning call, please schedule INR check as soon as possible.    Usman Covarrubias RN

## 2021-05-28 NOTE — TELEPHONE ENCOUNTER
"Pt calls to report mild injury to R leg 24 hours ago.  \"Was getting into bathtub yesterday.\"  \"L foot slid forward, and hit my R calf against side of tub.\"  When observing R calf today, pt states \"Looks like a welt on side of calf.\"  Welt is approx 4-to-5 inches.  \"Looks mildly swollen and a tiny bit warm.\"  \"Somewhat 'lighter in color' compared to rest of leg.\"  NOT bruise-like coloration.  No open wound.  No severe swelling.  \"No pain with walking.\"    Due to pt's history of DVT and current warfarin status, pt requests provider eval of leg to determine next best steps.  Warm transferred to a  for this purpose now.    Philomena Herrera RN BSBA  [facility] RN Triage     Reason for Disposition    Patient wants to be seen    Protocols used: LEG INJURY-A-OH      "

## 2021-05-28 NOTE — PROGRESS NOTES
Assessment/Plan:     1. Community acquired pneumonia of right lower lobe of lung (H)  Will await radiology final interpretation of chest x-ray to determine whether additional imaging will be needed in the future.  Prescription provided for levofloxacin.  In light of her wheezing, I also gave her a 5-day course of prednisone.  I suspect she has some underlying emphysematous condition as well due to prolonged history of smoking.  I provide her with a prescription for albuterol inhaler to use as needed.  Notify with lack of improvement over the next 3 days or lack of complete resolution over the next several weeks.  - XR Chest 2 Views  - levoFLOXacin (LEVAQUIN) 750 MG tablet; Take 1 tablet (750 mg total) by mouth daily for 5 days.  Dispense: 5 tablet; Refill: 0  - predniSONE (DELTASONE) 20 MG tablet; Take 20 mg by mouth daily for 5 days.  Dispense: 5 tablet; Refill: 0  - albuterol (PROAIR HFA;PROVENTIL HFA;VENTOLIN HFA) 90 mcg/actuation inhaler; Inhale 2 puffs every 6 (six) hours as needed for wheezing.  Dispense: 1 each; Refill: 0  - acetaminophen-codeine (TYLENOL #3) 300-30 mg per tablet; Take 1 tablet by mouth every 4 (four) hours as needed for pain.  Dispense: 20 tablet; Refill: 0    2. Chest wall pain  Chest wall strain versus potentially a fractured rib that is not visible on her x-ray.  Reviewed symptomatic treatments.  She has responded quite well to Tylenol 3's in the past, is agreeable to using it very sparingly for more severe pain, instructed not to drive after taking and not to combine with other sedating agents or alcohol.  She states understanding.  She would like to stay away from other narcotic medications such as oxycodone due to prior substance abuse recovery.  Must avoid NSAIDs at this time due to intake of warfarin.  - XR Chest 2 Views    3. Factor V Leiden (H)  4. Deep vein thrombosis (DVT) of lower extremity (H)  INR drawn today.  Chest wall pain is very highly suggestive of musculoskeletal or  "primary pulmonary etiology, she has no symptoms to suggest recurrence of DVT or new risk of pulmonary embolism.  INR drawn today.  - INR      There are no Patient Instructions on file for this visit.     Return in about 10 months (around 3/2/2020) for Annual physical.      Subjective:      Prerna Pizarro is a 63 y.o. female presented to clinic today for evaluation of illness.  Onset 1 week ago initially of severe sore throat followed by nasal congestion.  About 5 days ago she developed a fever for 2 days as high as 102, and noted onset of a cough that she describes as \"hard\" initially productive of bland sputum and now no longer productive.  Over the past few days has had a sensation of tightness in her anterior chest causing it to be harder to take a deep breath.  Describes a bandlike tightness that radiates around to her back.  Worsened over the last day.  She has some increase in postnasal drainage and some mild sinus tenderness as well, general tendency towards sinus infections.  Elevating the head of her bed at night to sleep but despite this coughing frequently.  This morning she was coughing and felt a sudden severe pain in her right low chest laterally and also heard a loud snap.  Has had pain with deep breathing or with coughing since then.  Remote history of having use an albuterol inhaler, no recent, and has noted some wheezing with this illness.  Chronic smoker, quit smoking 2 days ago with the assistance of Chantix, and is pleased with the effect of the medication thus far.  She has a history of DVT and factor V Leiden, remains on chronic warfarin.    Current Outpatient Medications   Medication Sig Dispense Refill     albuterol (PROAIR HFA;PROVENTIL HFA;VENTOLIN HFA) 90 mcg/actuation inhaler Inhale 2 puffs every 4 (four) hours as needed for wheezing or shortness of breath (cough). 1 each 11     buPROPion (WELLBUTRIN XL) 300 MG 24 hr tablet Take 1 tablet (300 mg total) by mouth daily. 90 tablet 3     " citalopram (CELEXA) 40 MG tablet Take 1 tablet (40 mg total) by mouth daily. 90 tablet 3     multivitamin with minerals (THERA-M) 9 mg iron-400 mcg Tab tablet Take 1 tablet by mouth daily.       NAPROXEN SODIUM (ALEVE ORAL) Take 220 mg by mouth daily as needed.        traZODone (DESYREL) 50 MG tablet Take 1 tablet (50 mg total) by mouth at bedtime. 90 tablet 3     varenicline (CHANTIX STARTING MONTH BOX) 0.5 mg (11)- 1 mg (42) tablet 1 wk before you stop smoking take 0.5mg daily on days 1-3, 0.5mg 2 times each day on days 4-7, then 1mg 2 times daily. 53 tablet 0     varenicline (CHANTIX) 1 mg tablet Take 1 tab by mouth two times a day. Take after eating with a full glass of water. NOTE: Dispense as maintenance for refills only. 60 tablet 2     warfarin (COUMADIN) 10 MG tablet Take 10 mg by mouth at bedtime. Taking 10mg every night       acetaminophen-codeine (TYLENOL #3) 300-30 mg per tablet Take 1 tablet by mouth every 4 (four) hours as needed for pain. 20 tablet 0     albuterol (PROAIR HFA;PROVENTIL HFA;VENTOLIN HFA) 90 mcg/actuation inhaler Inhale 2 puffs every 6 (six) hours as needed for wheezing. 1 each 0     levoFLOXacin (LEVAQUIN) 750 MG tablet Take 1 tablet (750 mg total) by mouth daily for 5 days. 5 tablet 0     predniSONE (DELTASONE) 20 MG tablet Take 20 mg by mouth daily for 5 days. 5 tablet 0     No current facility-administered medications for this visit.        Past Medical History, Family History, and Social History reviewed.  Past Medical History:   Diagnosis Date     Acute bronchitis      Anxiety      Black tarry stools      Depression      ETOH abuse      Factor V Leiden (H)      Ovarian mass      Seasonal allergies      Past Surgical History:   Procedure Laterality Date     ID APPENDECTOMY      Description: Appendectomy;  Recorded: 2011;     ID  DELIVERY ONLY      Description:  Section;  Recorded: 2011;     ID DILATION/CURETTAGE,DIAGNOSTIC      Description: Dilation And  Curettage;  Recorded: 08/24/2011;     MD GASTRIC BYPASS,OBESE<150CM NII-EN-Y      Description: Gastric Surgery For Morbid Obesity Bypass With Nii-en-Y;  Recorded: 08/24/2011;     MD INCISE SPINAL CORD TRACT(S)      Description: Laminectomy With Myelotomy Cervical;  Recorded: 08/24/2011;     TRANSURETHRAL RESECTION OF BLADDER TUMOR       Contrast [iohexol]; Diatrizoate meglumine; Iodine; and Tetracyclines  Family History   Problem Relation Age of Onset     Brain cancer Father      Cancer Sister         ovarian     Esophageal cancer Mother      Alcohol abuse Mother      Cancer Mother         throat     Alcohol abuse Brother      No Medical Problems Brother      No Medical Problems Brother      No Medical Problems Brother      Social History     Socioeconomic History     Marital status: Single     Spouse name: Not on file     Number of children: Not on file     Years of education: Not on file     Highest education level: Not on file   Occupational History     Not on file   Social Needs     Financial resource strain: Not on file     Food insecurity:     Worry: Not on file     Inability: Not on file     Transportation needs:     Medical: Not on file     Non-medical: Not on file   Tobacco Use     Smoking status: Former Smoker     Packs/day: 0.75     Smokeless tobacco: Never Used   Substance and Sexual Activity     Alcohol use: No     Comment: Recovering alcoholic. Last drink was 1/29/13     Drug use: No     Sexual activity: Not Currently     Partners: Male     Birth control/protection: Post-menopausal   Lifestyle     Physical activity:     Days per week: Not on file     Minutes per session: Not on file     Stress: Not on file   Relationships     Social connections:     Talks on phone: Not on file     Gets together: Not on file     Attends Sikh service: Not on file     Active member of club or organization: Not on file     Attends meetings of clubs or organizations: Not on file     Relationship status: Not on file  "    Intimate partner violence:     Fear of current or ex partner: Not on file     Emotionally abused: Not on file     Physically abused: Not on file     Forced sexual activity: Not on file   Other Topics Concern     Not on file   Social History Narrative     Not on file         Review of systems is as stated in HPI, and the remainder of the 10 system review is otherwise negative.    Objective:     Vitals:    05/02/19 1003   BP: 108/62   Pulse: 70   Resp: 20   Temp: 99  F (37.2  C)   TempSrc: Oral   SpO2: 94%   Weight: 206 lb (93.4 kg)   Height: 5' 2\" (1.575 m)    Body mass index is 37.68 kg/m .    Alert female.  No acute distress with breathing at a relaxed pace and with speaking, but does wince when she takes deep breath or when she coughs.  Cough is deep.  Mucous membranes moist.  Tympanic memories pearly and translucent.  Nasal mucosa is erythematous and edematous with clear drainage.  Oropharynx clear.  Neck supple without lymphadenopathy.  Heart is with regular rate and rhythm.  Lungs with diffuse expiratory wheezes heard throughout her lung fields, nonfocal.  They do not clear with cough.  No cyanosis.    I ordered and personally reviewed 2 view chest x-ray which reveals a streaky infiltrate in the right lung base.  It does not disrupt the cardiac or diaphragm borders.  Final radiology interpretation pending.      This note has been dictated using voice recognition software. Any grammatical or context distortions are unintentional and inherent to the the software.   "

## 2021-05-29 ENCOUNTER — RECORDS - HEALTHEAST (OUTPATIENT)
Dept: ADMINISTRATIVE | Facility: CLINIC | Age: 66
End: 2021-05-29

## 2021-05-29 NOTE — PROGRESS NOTES
"Assessment/Plan:    1. Closed Colles' fracture of left radius with routine healing, subsequent encounter  Left wrist closed Colles' fracture requiring ORIF repair May 23, 2019 with transitional care management and med reconciliation completed.  Hospital discharge May 26, 2019 reviewed as noted below.  Patient is leaving for a house boat fishing tour tomorrow and will return on Sunday and will bridge with Lovenox until she returns and then INR recheck Monday morning Sisi 3.  Patient had been given vitamin K in hospital to reverse INR prior to surgery.  INR was 1.13 prior to discharge.  INR today was 1.3.  Patient will follow-up with Dr. David Subramanian orthopedics tomorrow prior to leaving for her vacation.  Distal CMS intact left upper extremity.  - enoxaparin (LOVENOX) 300 mg/3 mL Soln; Inject 0.9 mL (90 mg total) under the skin every 12 (twelve) hours for 2 days.  Dispense: 3.6 mL; Refill: 0    2. Deep vein thrombosis (DVT) of lower extremity (H)  History of DVT with factor V Leiden mutation.  Continues chronic warfarin anticoagulation with warfarin 10 mg daily.  - INR    3. Factor V Leiden (H)  Continues chronic warfarin anticoagulation with history of DVT x2 without history of pulmonary emboli.  - INR  - enoxaparin (LOVENOX) 300 mg/3 mL Soln; Inject 0.9 mL (90 mg total) under the skin every 12 (twelve) hours for 2 days.  Dispense: 3.6 mL; Refill: 0    4. History of DVT (deep vein thrombosis)  As above.  - enoxaparin (LOVENOX) 300 mg/3 mL Soln; Inject 0.9 mL (90 mg total) under the skin every 12 (twelve) hours for 2 days.  Dispense: 3.6 mL; Refill: 0    5. Normochromic normocytic anemia  Acute blood loss anemia noted with hemoglobin decreasing from 12.7 down to 11.0.  No current concerns for active bleeding.    6. Alcoholism (H)  History of alcoholism with sobriety currently x6-1/2 years.  Cautious use of hydromorphone for breakthrough pain.  Attempt to avoid diazepam use for \"muscle " "spasm\".          Subjective:    Prerna Pizarro is seen today for hospital follow-up.  Left comminuted intra-articular distal radius fracture with impaction and angulation.  Required ORIF procedure.  Postoperative pain intractable and was kept through May 26, 2019 in hospital before discharge.  Required warfarin reversal with vitamin K.  Typically using 10 mg daily for warfarin anticoagulation with factor V Leiden coagulopathy.  History of depression continue citalopram 40 mg daily and bupropion extended release 300 mg daily.  History of SVT without current palpitations or shortness of breath.  Quit smoking 11 days ago.  Has not used alcohol in over 6 years with history of alcoholism.  Was put on Lovenox bridging however will run out on Friday.  Scheduled to leave tomorrow after visiting with Dr. Hernandez orthopedic specialist and going on a house boat on [place] through Sunday.  Will be back at evening.  No current bleeding issues.  Occasional tingling in fingers then this resolves otherwise sensation appears appropriate with movement of fingers intact.  Comprehensive review of systems as above otherwise all negative.      Reviewed hospital d/c summary from 5/26/19:  Prerna Pizarro is a 63 y.o. old female with fall   1. Left comminuted intra-articular distal radius fracture  - Orthopedic consult appreciated   - s/p ORIF left distal radius frx.   - intractable pain not controlled and kept another night and pain controled with dilaudid and valium   - PT/OT HC  - ortho to see and clear for discharge   - activity, restrictions and f/u per ortho       2. H/o Factor V Leiden Coagulopathy:  recurrent DVT   - reversed for surgery  - warfarin restarted and INR 1.13 needs lovenox for bridging given high risk of thrombus with previous DVT. Spoke with ortho and they are ok for lovenox  - needs to see PCP for INR check in 3 days and if needs refill of lovenox  - HC RN in 24hrs and check INR and send to PCP   - no NSAIDs while on " lovenox and warfarin  - needs to see PCP for clearance for house boat to early to tell with INR 1          Past Surgical History:   Procedure Laterality Date     ABDOMINAL SURGERY       APPENDECTOMY       OR  DELIVERY ONLY      Description:  Section;  Recorded: 2011;     OR DILATION/CURETTAGE,DIAGNOSTIC      Description: Dilation And Curettage;  Recorded: 2011;     OR GASTRIC BYPASS,OBESE<150CM NII-EN-Y      Description: Gastric Surgery For Morbid Obesity Bypass With Nii-en-Y;  Recorded: 2011;     OR INCISE SPINAL CORD TRACT(S)      Description: Laminectomy With Myelotomy Cervical;  Recorded: 2011;     OR REVISE MEDIAN N/CARPAL TUNNEL SURG Right 2019    Procedure: RELEASE, CARPAL TUNNEL;  Surgeon: Randolph Hernandez MD;  Location: [facility] [place];  Service: Hand     TRANSURETHRAL RESECTION OF BLADDER TUMOR          Family History   Problem Relation Age of Onset     Brain cancer Father      Cancer Sister         ovarian     Esophageal cancer Mother      Alcohol abuse Mother      Cancer Mother         throat     Alcohol abuse Brother      No Medical Problems Brother      No Medical Problems Brother      No Medical Problems Brother         Past Medical History:   Diagnosis Date     Anxiety      Depression      ETOH abuse      Factor V Leiden (H)      Ovarian mass      Seasonal allergies         Social History     Tobacco Use     Smoking status: Former Smoker     Packs/day: 0.75     Last attempt to quit: 2019     Years since quittin.0     Smokeless tobacco: Never Used   Substance Use Topics     Alcohol use: No     Comment: Recovering alcoholic. Last drink was 13     Drug use: No        Current Outpatient Medications   Medication Sig Dispense Refill     buPROPion (WELLBUTRIN XL) 300 MG 24 hr tablet Take 1 tablet (300 mg total) by mouth daily. 90 tablet 3     citalopram (CELEXA) 40 MG tablet Take 1 tablet (40 mg total) by mouth daily. (Patient taking  differently: Take 40 mg by mouth at bedtime.       ) 90 tablet 3     diazePAM (VALIUM) 2 MG tablet Take 0.5 tablets (1 mg total) by mouth every 6 (six) hours as needed (muscle spasms). 5 tablet 0     enoxaparin (LOVENOX) 300 mg/3 mL Soln Inject 0.9 mL (90 mg total) under the skin every 12 (twelve) hours for 2 days. 3.6 mL 0     HYDROmorphone (DILAUDID) 2 MG tablet Take 0.5 tablets (1 mg total) by mouth every 4 (four) hours as needed for pain. 12 tablet 0     multivitamin with minerals (THERA-M) 9 mg iron-400 mcg Tab tablet Take 1 tablet by mouth at bedtime.              traZODone (DESYREL) 50 MG tablet Take 1 tablet (50 mg total) by mouth at bedtime. 90 tablet 3     varenicline (CHANTIX) 1 mg tablet Take 1 tab by mouth two times a day. Take after eating with a full glass of water. NOTE: Dispense as maintenance for refills only. 60 tablet 2     warfarin (COUMADIN) 10 MG tablet Take 10 mg by mouth at bedtime. Taking 10mg every night       docusate sodium (COLACE) 100 MG capsule Take 1 capsule (100 mg total) by mouth 2 (two) times a day.  0     polyethylene glycol (MIRALAX) 17 gram packet Take 1 packet (17 g total) by mouth daily.  0     No current facility-administered medications for this visit.           Objective:    Vitals:    05/28/19 0846   BP: 110/70   Pulse: 63   Temp: 98.1  F (36.7  C)   SpO2: 94%   Weight: 207 lb (93.9 kg)      Body mass index is 36.67 kg/m .    Alert.  Distal CMS intact left upper extremity.  Cast with sling.  Chest clear.  Cardiac exam regular.  Extremities warm and dry.      EXAM: XR WRIST LEFT 3 OR MORE VWS  LOCATION: [facility]  DATE/TIME: 5/23/2019 3:15 PM     INDICATION: wrist deformity, fall  COMPARISON: None.     FINDINGS: There is a comminuted intra-articular fracture of the distal radius with impaction and dorsal angulation.          This note has been dictated using voice recognition software and as a result may contain minor grammatical errors and unintended word  substitutions.

## 2021-05-29 NOTE — ANESTHESIA CARE TRANSFER NOTE
Last vitals:   Vitals:    05/24/19 1630   BP: 145/79   Pulse: 68   Resp: (!) 6   Temp: 36.6  C (97.8  F)   SpO2: 91%     Patient's level of consciousness is drowsy  Spontaneous respirations: yes  Maintains airway independently: yes  Dentition unchanged: yes  Oropharynx: oropharynx clear of all foreign objects    QCDR Measures:  ASA# 20 - Surgical Safety Checklist: WHO surgical safety checklist completed prior to induction    PQRS# 430 - Adult PONV Prevention: 4558F - Pt received => 2 anti-emetic agents (different classes) preop & intraop  ASA# 8 - Peds PONV Prevention: NA - Not pediatric patient, not GA or 2 or more risk factors NOT present  PQRS# 424 - Michelle-op Temp Management: 4559F - At least one body temp DOCUMENTED => 35.5C or 95.9F within required timeframe  PQRS# 426 - PACU Transfer Protocol: - Transfer of care checklist used  ASA# 14 - Acute Post-op Pain: ASA14B - Patient did NOT experience pain >= 7 out of 10

## 2021-05-29 NOTE — TELEPHONE ENCOUNTER
Pt calling back.  She did receive ACN's message and will take 20 mg of warfarin tonight.  She does report that she ran out of lovenox on Sunday and has not taken since then.    INR today of 1.9 (giving boost dose of 20 mg tonight)    PCP, do you want pt to resume lovenox injections until INR greater than or equal to 2.0?

## 2021-05-29 NOTE — TELEPHONE ENCOUNTER
ACN called and spoke with pt. She did take booster dose of 20 mg warfarin yesterday 6/4. Will check INR today at 3:30 pm to make sure it's 2.0 or above, then we won't have to worry about doing Lovenox.

## 2021-05-29 NOTE — TELEPHONE ENCOUNTER
Left message to call back for: symptom  Information to relay to patient:  Please notify patient per Dr. Napoles she should continue antihistamine (ok to use oral or topical benadryl). If worsening, needs to be seen.

## 2021-05-29 NOTE — TELEPHONE ENCOUNTER
Patient Returning Call  Reason for call:  Patient calling back  Information relayed to patient:  Relayed below message from provider  Patient has additional questions:  No  If YES, what are your questions/concerns:  none  Okay to leave a detailed message?: No call back needed

## 2021-05-29 NOTE — TELEPHONE ENCOUNTER
Pt has factor 5 and is on coumadin  Broke a wrist and had surgery 05 24 19  Pt was on lovenox-last shot Sunday am and at the sites on her abdomen she noted she has a red rash (slightly raised bumps)  around some injection sites and it is very itchy and was waiting for it to go away -she thinks that could be allergic reaction from the lovenox and has has them in the past  The rash on her abdomen is itching and burning  She will try an antihistamine to see if it helps    Any other recommendations, please call her cell at  Prerna Vega, [facility] RN Triage          Reason for Disposition    Mild localized rash    Protocols used: RASH OR REDNESS - [id number]

## 2021-05-29 NOTE — TELEPHONE ENCOUNTER
I spoke with patient Prerna today. She was unclear on why home care was ordered for her. She decided to decline service till she can speak with her PCP.

## 2021-05-29 NOTE — TELEPHONE ENCOUNTER
Anticoagulation Management    Unable to reach Prerna today.    Today's INR result of 1.9 is Subtherapeutic (goal INR of 2.0-3.0)     Follow up required to provide dosing instructions     Left message to take a booster dose of warfarin,  20 mg tonight. Continue Lovenox q12h.          ACN to follow up    Usman Covarrubias RN

## 2021-05-29 NOTE — ANESTHESIA POSTPROCEDURE EVALUATION
Patient: Prerna Pizarro  OPEN REDUCTION INTERNAL FIXATION, FRACTURE, LEFT DISTAL RADIUS, CARPAL TUNNEL RELEASE  Anesthesia type: general    Patient location: PACU  Last vitals:   Vitals Value Taken Time   /63 5/24/2019  7:00 PM   Temp 37  C (98.6  F) 5/24/2019  5:58 PM   Pulse 57 5/24/2019  7:00 PM   Resp 12 5/24/2019  7:00 PM   SpO2 95 % 5/24/2019  7:00 PM     Post vital signs: stable  Level of consciousness: awake and responds to simple questions  Post-anesthesia pain: pain controlled  Post-anesthesia nausea and vomiting: no  Pulmonary: unassisted, return to baseline  Cardiovascular: stable and blood pressure at baseline  Hydration: adequate  Anesthetic events: no    QCDR Measures:  ASA# 11 - Michelle-op Cardiac Arrest: ASA11B - Patient did NOT experience unanticipated cardiac arrest  ASA# 12 - Michelle-op Mortality Rate: ASA12B - Patient did NOT die  ASA# 13 - PACU Re-Intubation Rate: ASA13B - Patient did NOT require a new airway mgmt  ASA# 10 - Composite Anes Safety: ASA10A - No serious adverse event    Additional Notes:

## 2021-05-29 NOTE — TELEPHONE ENCOUNTER
Who is calling:  Patient  Reason for Call:  The patient is returning a call to anticoagulation regarding her INR today. Please advise at the number provided.  Okay to leave a detailed message: Yes

## 2021-05-29 NOTE — ANESTHESIA PREPROCEDURE EVALUATION
Anesthesia Evaluation      No history of anesthetic complications     Airway   Mallampati: II  Neck ROM: full   Pulmonary    (+) pneumonia (RLL PNA 1 mo ago, symptomatically improved),   (-) COPD, asthma, shortness of breath, sleep apnea    PE comment: Faint crackles RLL, o/w CTAB                         Cardiovascular   Exercise tolerance: > or = 4 METS  Dysrhythmias:    Rhythm: regular  Rate: normal,      ROS comment: Factor V leiden disorder, AC with coumadin, last today. INR 2.6 --> 2.4. Currently receiving Vitamin K, possible FFP     Neuro/Psych    (+) depression, anxiety/panic attacks,     Endo/Other    (+) obesity (BMI 36),      Comments: L distal radius fx s/p mechanical fall, s/f ORIF    GI/Hepatic/Renal - negative ROS      Other findings: 2L O2 NC      Dental    (+) poor dentition                       Anesthesia Plan  Planned anesthetic: general LMA  LMA 4  Tylenol 1g, Oxycodone 10mg, Gabapentin 300mg in preop  Ketamine 50mg post induction for post op pain  Consented for rescue block in PACU pending downtrending INR  Decadron 10, Zofran 4 for antiemesis   ASA 3 - emergent   Induction: intravenous   Anesthetic plan and risks discussed with: patient  Anesthesia plan special considerations: antiemetics,   Post-op plan: routine recovery

## 2021-05-30 ENCOUNTER — RECORDS - HEALTHEAST (OUTPATIENT)
Dept: ADMINISTRATIVE | Facility: CLINIC | Age: 66
End: 2021-05-30

## 2021-05-30 VITALS — BODY MASS INDEX: 36.3 KG/M2 | WEIGHT: 204.9 LBS

## 2021-05-30 NOTE — TELEPHONE ENCOUNTER
ANTICOAGULATION  MANAGEMENT PROGRAM    Prerna Pizarro is overdue for INR check.  Reminder call made.    Left message for Prerna. If returning call, please schedule INR check as soon as possible.    Shannan Pastor RN

## 2021-05-31 ENCOUNTER — RECORDS - HEALTHEAST (OUTPATIENT)
Dept: ADMINISTRATIVE | Facility: CLINIC | Age: 66
End: 2021-05-31

## 2021-05-31 VITALS — BODY MASS INDEX: 35.72 KG/M2 | WEIGHT: 208.1 LBS

## 2021-05-31 NOTE — TELEPHONE ENCOUNTER
Reason contacted:  INR  Information relayed:  Appointment scheduled tomorrow at 8:40 am at the [facility]. Discussed the importance of having INR checked as recommended by ACN.   Additional questions:  No  Further follow-up needed:  No  Okay to leave a detailed message:  No

## 2021-05-31 NOTE — TELEPHONE ENCOUNTER
ANTICOAGULATION MANAGEMENT PROGRAM--Non-Compliance Chart Review    Prerna Pizarro is overdue for INR follow up.      INR Results:   Lab Results   Component Value Date    INR 2.90 (H) 06/14/2019    INR 2.60 (H) 06/05/2019    INR 1.90 (H) 06/04/2019         Chart reviewed, continue non-compliance protocol       Susan Estrada RN

## 2021-05-31 NOTE — TELEPHONE ENCOUNTER
ANTICOAGULATION MANAGEMENT PROGRAM--FINAL REMINDER NOTIFICATION     Dr. Napoles,    Your patient, Prerna Pizarro, has been under the care of the [facility] Anticoagulation Management Program for warfarin management. Our records indicate that Prerna is either past due for an INR check or has not followed clinic staff recommendations. The patient s last INR was 2.9 on 6/14/2019.      Prerna received two phone calls and one letter over the last several weeks in attempt to arrange a follow up appointment.  Prerna is being sent a final reminder letter today to attempt to arrange a follow up appointment.      The [facility] Anticoagulation Management Program is unable to manage anticoagulation therapy for non-compliant patients. If Prerna does not schedule follow up within 3 weeks from the date of this letter,  you can decide to resume management of Prerna's warfarin therapy or Prerna can be moved to our noncompliance status, which would consist of a phone call every 6 weeks until INR is checked or when Prerna is up for yearly renewal.     Please don t hesitate to contact the Anticoagulation Management Program at [phone] if you have any questions or concerns.     Sincerely,     [facility]agulation [facility]

## 2021-06-01 VITALS — HEIGHT: 64 IN | BODY MASS INDEX: 34.49 KG/M2 | WEIGHT: 202 LBS

## 2021-06-01 VITALS — WEIGHT: 204.6 LBS | BODY MASS INDEX: 35.12 KG/M2

## 2021-06-02 ENCOUNTER — RECORDS - HEALTHEAST (OUTPATIENT)
Dept: ADMINISTRATIVE | Facility: CLINIC | Age: 66
End: 2021-06-02

## 2021-06-02 VITALS — WEIGHT: 202 LBS | HEIGHT: 62 IN | BODY MASS INDEX: 37.17 KG/M2

## 2021-06-02 VITALS — HEIGHT: 62 IN | BODY MASS INDEX: 37.36 KG/M2 | WEIGHT: 203 LBS

## 2021-06-02 NOTE — PROGRESS NOTES
Assessment/Plan:     Patient presents to clinic for numerous concerns.    1. Tobacco abuse counseling  Patient's hotflashes recurred after restarting smoking. Would attempt quitting again and see if symptoms improve. Reordered chantix.  - varenicline (CHANTIX) 1 mg tablet; Take 1 tab by mouth two times a day. Take after eating with a full glass of water. NOTE: Dispense as maintenance for refills only.  Dispense: 60 tablet; Refill: 2    2. Mixed conductive and sensorineural hearing loss of both ears  Recommended at last appointment, ordered today:  - Ambulatory referral to Audiology    3. Encounter for screening mammogram for breast cancer  Patient desired a repeat referral:  - Mammo Screening Bilateral; Future    4. Symptomatic varicose veins of right lower extremity, pedal edema, history of DVT  Large vessel present in the right upper thigh, history of DVT, currently symptomatic. Would also have clinic evaluate spider vessels, other varicose veins and chronic pedal edema. Recommended compression stockings.   - Ambulatory referral to Vascular Medicine    5. Paranasal sinus disease  Recommended smoking cessation and flonase as needed.    6. Lipid screening  - discussed patient's cholesterol screening, no indication to start a statin today    7. Factor V Leiden (H)  8. Deep vein thrombosis (DVT) of lower extremity (H)  - INR      Return to clinic in 2 months for follow up.    Total time spent with patient was 20 minutes with greater than 50% spent in face-to-face counseling regarding the above plan.    Subjective:      Prerna Pizarro is a 63 y.o. female who presents to clinic for leg veins and audiology referral.     Patient has restarted smoking. She did well on Chantix in the past. She would like this reordered.    Audiology  - has had intermittent hearing loss in the past  - would prefer to be seen in [place]    Varicose vein  - located in the right upper thigh  - patient has tried compression in the past  - she  is now walking and it is causing her pain  - she also has numerous spider vessels that are apparent but not symptomatic     Swelling by nose  - unilateral on the left side  - present x 2-3 weeks  - has a slight headache on the same side  - she is having allergy symptoms    Mammogram  - would like this reordered so that they call her to schedule    Cholesterol results  - she is confused by her eating habits  The 10-year ASCVD risk score (Elham ALLEN Jr., et al., 2013) is: 4.4%    Values used to calculate the score:      Age: 63 years      Sex: Female      Is Non- : No      Diabetic: No      Tobacco smoker: Yes      Systolic Blood Pressure: 108 mmHg      Is BP treated: No      HDL Cholesterol: 80 mg/dL      Total Cholesterol: 182 mg/dL        Past Medical History, Family History, and Social History reviewed.     Current Outpatient Medications on File Prior to Visit   Medication Sig Dispense Refill     buPROPion (WELLBUTRIN XL) 300 MG 24 hr tablet Take 1 tablet (300 mg total) by mouth daily. 90 tablet 3     multivitamin with minerals (THERA-M) 9 mg iron-400 mcg Tab tablet Take 1 tablet by mouth at bedtime.              traZODone (DESYREL) 50 MG tablet Take 1 tablet (50 mg total) by mouth at bedtime. 90 tablet 3     warfarin (COUMADIN) 10 MG tablet Take 10 mg by mouth at bedtime. Taking 10mg every night       diazePAM (VALIUM) 2 MG tablet Take 0.5 tablets (1 mg total) by mouth every 6 (six) hours as needed (muscle spasms). 5 tablet 0     [DISCONTINUED] citalopram (CELEXA) 40 MG tablet Take 1 tablet (40 mg total) by mouth daily. (Patient taking differently: Take 40 mg by mouth at bedtime.       ) 90 tablet 3     [DISCONTINUED] docusate sodium (COLACE) 100 MG capsule Take 1 capsule (100 mg total) by mouth 2 (two) times a day.  0     [DISCONTINUED] HYDROmorphone (DILAUDID) 2 MG tablet Take 0.5 tablets (1 mg total) by mouth every 4 (four) hours as needed for pain. 12 tablet 0     [DISCONTINUED]  polyethylene glycol (MIRALAX) 17 gram packet Take 1 packet (17 g total) by mouth daily.  0     [DISCONTINUED] varenicline (CHANTIX) 1 mg tablet Take 1 tab by mouth two times a day. Take after eating with a full glass of water. NOTE: Dispense as maintenance for refills only. 60 tablet 2     No current facility-administered medications on file prior to visit.        Review of systems is as stated in HPI.  Endorses: weakness, Fatigue, arthritis, joint swelling, heat intolerance, memory loss  The remainder of the 10 system review is otherwise negative.    Objective:     /82   Pulse 77   Wt 204 lb (92.5 kg)   SpO2 99%   BMI 36.14 kg/m   Body mass index is 36.14 kg/m .    Gen: Alert, NAD, appears stated age, normal hygiene   ENT/mouth: trace puffiness appreciated on patient's left paranasal sinuses without significant tenderness to palpation  SKIN: obvious spider vessels appreciated on bilateral feet  Heme/lymph: pedal edema, trace, present    This note has been dictated using voice recognition software. Any grammatical or context distortions are unintentional and inherent to the the software.     Kenya Napoles MD

## 2021-06-02 NOTE — TELEPHONE ENCOUNTER
Anticoagulation Annual Referral Renewal Review    Prerna Pizarro's chart reviewed for annual renewal of referral to anticoagulation monitoring.        Criteria for anticoagulation nurse and/or pharmacist renewal met   Warfarin indication: DVT and Factor V Leiden Yes, per indication   Current with INR monitoring/compliant Yes Yes   Date of last office visit 10/10/19 Yes, had office visit within last year   Time in Therapeutic Range (TTR)  n/a       Prerna Pizarro met all criteria for anticoagulation management program initiated renewal.  New INR standing orders and anticoagulation referral renewal placed.      Usman Covarrubias RN  1:55 PM

## 2021-06-02 NOTE — TELEPHONE ENCOUNTER
RN cannot approve Refill Request    RN can NOT refill this medication med is not covered by policy/route to provider. Last office visit: 10/10/2019 Kenya Napoles MD Last Physical: 2/28/2019 Last MTM visit: Visit date not found Last visit same specialty: 10/10/2019 Kenya Napoles MD.  Next visit within 3 mo: Visit date not found  Next physical within 3 mo: Visit date not found      Maria Victoria Leija, [facility] Triage/Med Refill 10/31/2019    Requested Prescriptions   Pending Prescriptions Disp Refills     warfarin (COUMADIN/JANTOVEN) 10 MG tablet [Pharmacy Med Name: WARFARIN SOD 10MG (TEN MG) TB WHITE] 90 tablet 0     Sig: TAKE 1 TABLET BY MOUTH DAILY       Warfarin Refill Protocol  Failed - 10/31/2019 11:54 AM        Failed -  Route to appropriate pool/provider     Last Anticoagulation Summary:   Anticoagulation Episode Summary     Current INR goal:   2.0-3.0   TTR:   85.3 %   Next INR check:   11/7/2019   INR from last check:   2.80 (10/10/2019)   Weekly max warfarin dose:      Target end date:      INR check location:      Preferred lab:      Send INR reminders to:   [facility]    Indications    Factor V Leiden (H) [D68.51]           Comments:            Anticoagulation Care Providers     Provider Role Specialty Phone number    Kenya Napoles MD Referring Family Medicine [phone]                Passed - Provider visit in last year     Last office visit with prescriber/PCP: 10/10/2019 Kenya Napoles MD OR same dept: Visit date not found OR same specialty: 10/10/2019 Kenya Napoles MD  Last physical: 2/28/2019 Last MTM visit: Visit date not found    Next appt within 3 mo: Visit date not found Next physical within 3 mo: Visit date not found  Prescriber OR PCP: Kenya Napoles MD  Last diagnosis associated with med order: 1. Factor V deficiency (H)  - warfarin (COUMADIN/JANTOVEN) 10 MG tablet [Pharmacy Med Name: WARFARIN SOD 10MG (TEN MG) TB WHITE]; TAKE 1 TABLET  BY MOUTH DAILY  Dispense: 90 tablet; Refill: 0    2. History of DVT (deep vein thrombosis)  - warfarin (COUMADIN/JANTOVEN) 10 MG tablet [Pharmacy Med Name: WARFARIN SOD 10MG (TEN MG) TB WHITE]; TAKE 1 TABLET BY MOUTH DAILY  Dispense: 90 tablet; Refill: 0    If protocol passes may refill for 6 months if within 3 months of last provider visit (or a total of 9 months).

## 2021-06-03 VITALS — WEIGHT: 207.44 LBS | BODY MASS INDEX: 36.75 KG/M2 | HEIGHT: 63 IN

## 2021-06-03 VITALS
WEIGHT: 205 LBS | BODY MASS INDEX: 36.32 KG/M2 | RESPIRATION RATE: 16 BRPM | HEART RATE: 76 BPM | SYSTOLIC BLOOD PRESSURE: 100 MMHG | HEIGHT: 63 IN | DIASTOLIC BLOOD PRESSURE: 72 MMHG | TEMPERATURE: 98.5 F

## 2021-06-03 VITALS
SYSTOLIC BLOOD PRESSURE: 108 MMHG | HEART RATE: 77 BPM | DIASTOLIC BLOOD PRESSURE: 82 MMHG | WEIGHT: 204 LBS | OXYGEN SATURATION: 99 % | BODY MASS INDEX: 36.14 KG/M2

## 2021-06-03 VITALS — BODY MASS INDEX: 37.11 KG/M2 | WEIGHT: 209.44 LBS | HEIGHT: 63 IN

## 2021-06-03 VITALS — BODY MASS INDEX: 37.91 KG/M2 | WEIGHT: 206 LBS | HEIGHT: 62 IN

## 2021-06-03 VITALS — WEIGHT: 207 LBS | BODY MASS INDEX: 36.67 KG/M2

## 2021-06-03 NOTE — TELEPHONE ENCOUNTER
Received call 11/27 that Dr. Schultz actually recommends bridging. Bridge arranged. See 11/29 encounter    Angela Daniel, PharmD

## 2021-06-03 NOTE — TELEPHONE ENCOUNTER
Who is calling:  patient  Reason for Call:  Patient called regarding bridging [facility]. She needs to know if she needs to bridge with Lovenox and will need an rx if she needs Lovenox.   Date of last appointment with primary care: 10/10/19  Okay to leave a detailed message: Yes

## 2021-06-03 NOTE — PROGRESS NOTES
>3month socks with some relief,Elevatevates as much as possible. Goes to gym 3 times a week at a gym. Walks 3 miles. Is working on weight loss and has lost 5 lbs.US prior to apt reviewed results.left pop chronic DVT continues. Right GSV RFA will preauth through  [employer] insurance.On  Warfarin for factor V  And chronic DVT. Dr Napoles will be contacted regarding holding coumadin and bridge with lovenox.Left Message with Mei at Dr Napoles's office. Will mail AVS to patient.

## 2021-06-03 NOTE — TELEPHONE ENCOUNTER
ANTICOAGULATION  MANAGEMENT PROGRAM    Prerna Pizarro is overdue for INR check.     Left message to call and schedule INR appointment as soon as possible.      Usman Covarrubias RN

## 2021-06-03 NOTE — TELEPHONE ENCOUNTER
Orders being requested: Hold and bridge orders, typically ask for a 5 to 6 day hold.  Reason service is needed/diagnosis: Patient is going to be scheduled for a vein procedure sometime in early December.  When are orders needed by: As soon as possible  Where to send Orders: Phone:  [phone] and press option 3, a message can be left for a nurse  Okay to leave detailed message?  Yes

## 2021-06-03 NOTE — TELEPHONE ENCOUNTER
Spoke with Jean Paul Goodwin RN at coumadin clinic. Reviewed Dr Schultz's note and explained he would recommend bridging with lovenox with patient with factor V. They will hold coumadin for 5 days and bridge with lovenox.

## 2021-06-03 NOTE — PROGRESS NOTES
This is a new consult for Varicose Veins. The patient has varicose veins that are problematic in bilateral legs (right worse than left). Symptoms patient has been experiencing are heaviness, aching, tiredness, cramps and  swelling. Patient has been wearing compression stockings. Patient has not had recent imaging on legs done. History of DVT in bilateral LE and factor V.

## 2021-06-03 NOTE — TELEPHONE ENCOUNTER
Spoke with patient regarding vein ablation procedure next week. Advised to bring a . Explained it is ok to eat and drink prior to procedure with exception of blood thinner. Patient will talk with PCP about holding coumadin and bridging with Lovenox as patient has Factor V. Patient will return my call.Verified patient's insurance [employer]. We will supply patient with a thigh high compression sock. We carry from size medium to extra large. If patient doesn't fit into them they will need to provide their own. Questions answered. Patient will call if any further questions.

## 2021-06-03 NOTE — TELEPHONE ENCOUNTER
Per RAI Miller for PCP MD Kenya Napoles,   it is okay to hold the warfarin for 5 days prior to the RFA without bridging.      MD Boggs support staff notified

## 2021-06-03 NOTE — PATIENT INSTRUCTIONS - HE
Varicose Vein Pre-Procedure Instructions    You are scheduled for a varicose vein treatment on your legs. The following is some helpful information for you in regards to your treatment.    **Important:  A  will be needed post procedure. We will supply a thigh high compression stocking for you unless if you have one.  Please be aware, it is not advised to fly within 3 weeks post procedure    Please wear comfortable clothing.  We recommend that you bring a change of under clothes; they may get stained by the cleansing solution.    Feel free to bring a personal music player or a CD to listen to during your procedure.    Take your routine medications as you normally would.    It is ok to eat prior to this procedure.  Hold coumadin 5 days prior to vein procedure and do Lovenox injections per primary Dr Napoles.    Please allow 1- 2 hours for your appointment.    For any questions regarding your procedure please call   [phone] to speak with the nurse.    If you would like a Good Marguerite Estimate for your upcoming service/procedure contact Cost of Care Estimates at [phone], advocates are available Monday through Friday 8am - 5pm.  Code right vein ablation [id number]    Please have the following information available:  1. Patient name and date of birth  2. Insurance company, plan name, ID and group numbers  3. Description of the service/procedure and the associated procedure code numbers, if available. If more than one (1) procedure code, indicate which will be the primary procedure code.   4. The facility where the service/procedure will be performed.  5. The name of the physician involved with the service/procedure.  6. Appointment date of service.  7. Telephone number to call with the information.

## 2021-06-03 NOTE — TELEPHONE ENCOUNTER
OUMOU-PROCEDURAL ANTICOAGULATION  MANAGEMENT    Assessment     Warfarin interruption plan for Vein Ablation on 12/3.    Recurrent DVT; Factor V Leiden      Risk stratification for thromboembolism: moderate. (2018 RUTH guidelines)        Received updated recommendation from Dr. Schultz's office recommending bridging on 12/27.       Plan       Pre-Procedure:      Continue holding warfarin until after procedure (11/28-12/2)       Lovenox 90 mg subq Q 12 hrs (1 mg/kg Q 12 hr for CrCl >30)     Start Lovenox: Saturday 11/30 AM      Last dose of Lovenox prior to procedure: Monday 12/2 PM  (3:30 procedure 12/3)       Post-Procedure:      Resume warfarin dose if okay with provider doing procedure on night of procedure, Tuesday 12/3 PM: 15 mg x 1 then 10 mg daily      Resume Lovenox ~ 24 hrs post procedure when okay with provider doing procedure. Continue until INR >= 2 per protocol      Recheck INR 4-6 days after resuming warfarin   ?     Spoke to Sen and reviewed above plan.     Enoxaparin (Lovenox) education provided. Reviewed: role of enoxaparin in bridge therapy, prescribed enoxaparin dose and frequency, recommended injection site and site rotation, proper technique for administration of subcutaneous injection, monitoring for signs and symptoms of bruising and bleeding and monitoring for signs and symptoms of clotting.     Sen verbalizes understanding and agrees to plan      Angela Daniel, PharmD    Subjective/Objective:      Prerna DONA Pizarro, a 63 y.o. female    Goal INR Range: 2-3    Patient bridged in past: Yes    Wt Readings from Last 3 Encounters:   11/12/19 205 lb (93 kg)   10/10/19 204 lb (92.5 kg)   05/28/19 207 lb (93.9 kg)      Ideal body weight: 52.4 kg (115 lb 8.3 oz)  Adjusted ideal body weight: 68.6 kg (151 lb 5 oz)     Lab Results   Component Value Date    INR 2.80 (H) 10/10/2019    INR 2.50 (H) 08/15/2019    INR 2.90 (H) 06/14/2019     Lab Results   Component Value Date    HGB 11.1 (L) 05/26/2019    HCT  35.2 05/26/2019     05/26/2019     Lab Results   Component Value Date    CREATININE 0.75 05/26/2019    CREATININE 0.72 05/25/2019    CREATININE 0.78 05/24/2019     Estimated CrCl:  83 ml/min (using adjusted weight of 68.8 kg, creatinine 0.75)

## 2021-06-03 NOTE — TELEPHONE ENCOUNTER
Spoke with pt, she was told she needed to bridge by vascular due to having clotting disorder.  Clarified this with vascular nurse.    Advised pt to start 5 day hold tomorrow as instructed and that we will contact her on Friday, 11/29 with the bridging instructions.

## 2021-06-03 NOTE — TELEPHONE ENCOUNTER
OUMOU-PROCEDURAL ANTICOAGULATION  MANAGEMENT    Assessment     Warfarin interruption plan for Endovenous closure due to Insufficiency of right greater saphenous vein        Risk stratification for thromboembolism: moderate. (2018 American Soceity of Hematology (RUTH)/Chest 2012)      Recurrent DVT  > 1 year since last clot (last 2012)    Factor V Leiden- non-severe thrombophilia      Hx of tolerating hold without bridge for dental extraction 10/2018    Recently bridged while inpatient 5/2019    VTE: 2018 American Society of Hematology recommends against bridging in low and moderate risk VTE patients holding warfarin    VTE: 2016 Anticoagulation Forum clinical guidance recommends, no bridge therapy for most VTE patients interrupting warfarin with possible exceptions for VTE within previous month, prior hx recurrent VTE during anticoagulation therapy interruption, or undergoing a procedure with high for VTE  (joint replacement surgery or major abdominal cancer resection    Recommendation:         INR check (overdue; reminder call made 11/14)      Okay to hold warfarin 5 days prior to procedure as requested by vascular      Suggest No bridge based on RUTH guidelines recommendation for moderate risk stratification; request PCP/surgeon input    Procedure is outpatient and patient previously tolerated hold without bridge    Unsure if PCP provider consider patient at higher clotting risk for this particular procedure due to procedure being on veins. If so, could consider post procedure only prophylaxis dose and/or therapeutic Lovenox bridge.     ?   Angela Daniel, PharmD    Subjective/Objective:      Prerna Pizarro, a 63 y.o. female    Reason for Anticoagulation: Recurrent DVT ( 2 DVTs; most recent 2012), Factor V Leiden    Goal INR Range: 2-3    Patient bridged in past:       Yes 5/2019-after reversal for surgery and hospitalization for Radius Fracture    No bridge with 10/2018-3 day hold for dental extractions    Wt  Readings from Last 3 Encounters:   11/12/19 205 lb (93 kg)   10/10/19 204 lb (92.5 kg)   05/28/19 207 lb (93.9 kg)      Ideal body weight: 52.4 kg (115 lb 8.3 oz)  Adjusted ideal body weight: 68.6 kg (151 lb 5 oz)     Lab Results   Component Value Date    INR 2.80 (H) 10/10/2019    INR 2.50 (H) 08/15/2019    INR 2.90 (H) 06/14/2019     Lab Results   Component Value Date    HGB 11.1 (L) 05/26/2019    HCT 35.2 05/26/2019     05/26/2019     Lab Results   Component Value Date    CREATININE 0.75 05/26/2019    CREATININE 0.72 05/25/2019    CREATININE 0.78 05/24/2019     Estimated CrCl:  83 ml/min using adjusted weight 68.6 kg and creatinine 0.75

## 2021-06-04 ENCOUNTER — COMMUNICATION - HEALTHEAST (OUTPATIENT)
Dept: ANTICOAGULATION | Facility: CLINIC | Age: 66
End: 2021-06-04

## 2021-06-04 VITALS
SYSTOLIC BLOOD PRESSURE: 116 MMHG | HEART RATE: 72 BPM | BODY MASS INDEX: 36.32 KG/M2 | WEIGHT: 205 LBS | DIASTOLIC BLOOD PRESSURE: 84 MMHG | TEMPERATURE: 98.6 F | HEIGHT: 63 IN | RESPIRATION RATE: 16 BRPM

## 2021-06-04 NOTE — TELEPHONE ENCOUNTER
"Sen called clinic this morning with symptoms of redness, swelling, tenderness and warmth on her right inner thigh area. Had RFA last week. She states this began on Friday and she spoke with an ER doctor yesterday that stated it sounded like phlebitis. She is concerned today because her temperature is 101. She got up this morning and took Tylenol at 4:30 am and at 6 am her temp was 101. She went to work today but generally feels \"crappy\" and \"flu like\".     Spoke to Dr. Schultz when he came through clinic and he stated it sounded like phlebitis. Will get message to him regarding her fever this morning of 101. She is unable to take her temperature now because she is at work.   "

## 2021-06-04 NOTE — PROGRESS NOTES
"Post Procedure Note Endovenous Closure    S: Prerna Pizarro is a 63 y.o. female S/P Right  leg endovenous closure ofRight lower ext. Two weeks out from procedure. Doing well, wore female  thigh high socks. Minimal discomfort. Some superficial phlibitis. Which is resolving.     O:   Vitals:    12/17/19 1107   BP: 116/84   Patient Site: Left Arm   Patient Position: Sitting   Cuff Size: Adult Large   Pulse: 72   Resp: 16   Temp: 98.6  F (37  C)   TempSrc: Oral   Weight: 205 lb (93 kg)   Height: 5' 3\" (1.6 m)       General: no apparent distress  Legs look good no signs of infection, incisions healing nicely.    Imaging:    US Venous Post Ablation Leg Right (Order [id number])   Imaging   Date: 12/5/2019 Department: [facility] Ultrasound [place] Released By: Kiki Trejo (now Kiki Trejo) Authorizing: Louie Schultz MD   Study Result     [facility] OUTPATIENT     12/5/2019 10:39 AM     EXAM: RIGHT LOWER EXTREMITY VENOUS ULTRASOUND     INDICATION: f/u rt gsv vnus closure     TECHNIQUE: Duplex imaging was performed utilizing gray-scale, compression, augmentation as appropriate, color-flow, Doppler waveform analysis, and spectral Doppler imaging.     FINDINGS:   RIGHT LEG: The GSV is thrombosed and noncompressible from the mid thigh through the proximal thigh (approximately 6 mm from the saphenofemoral junction). The greater saphenous vein is patent distal to the site of access within the distal thigh.     The right common femoral vein is ultrasound patent and compressible.        IMPRESSION:   1.  Closure of the right greater saphenous vein, as described.         A/P: S/p endovenous closure. For insuffiencey of veins     May switch to knee high support socks   Resume all activities   RTC 4 months   Call for any questions or concerns     Louie Schultz MD   [facility] Surgery  "

## 2021-06-04 NOTE — TELEPHONE ENCOUNTER
Discussed with patient.   offered script for keflex 750 mg two times a day  Patient wanting more I told her she should come in to be seen with a nurse visit if that is the case.

## 2021-06-04 NOTE — TELEPHONE ENCOUNTER
New Appointment Needed  What is the reason for the visit:    Same Date/Next Day Appt Request  What is the reason for your visit?: INR    Provider Preference: Lab  How soon do you need to be seen?: today, patient moved from 1pm to 2pm  Waitlist offered?: No  Okay to leave a detailed message:  No call back needed

## 2021-06-04 NOTE — TELEPHONE ENCOUNTER
MATT  Pt had her vein procedure on 12/3. Resumed warfarin on 12/4. Has not been bridging with Lovenox post-procedure because patient said she was instructed by the Vascular Center to d/c Lovenox bridge.    INR is 1.3 today. AC[facility] has given warfarin dosing instructions. Rechecking INR on Tues 12/10.

## 2021-06-04 NOTE — TELEPHONE ENCOUNTER
"  Call from pt       CC:  Status update after procedure       > Leg getting more sore to the touch    > Bruising has \"gotten deeper\"    > There is kind of a bump at the site of the injection site and a bit red         > \"Getting pretty darn sore to the touch\"      A/P     > Would suggest trying to contact the surgeons office directly first to see if they can provide guidance on anticipated after effects from procedure - I did give tele# that was on her DC instructions     CB if need additional support       Shiva Bullard RN   Triage and Medication Refills    "

## 2021-06-05 VITALS
SYSTOLIC BLOOD PRESSURE: 117 MMHG | OXYGEN SATURATION: 96 % | BODY MASS INDEX: 37.52 KG/M2 | TEMPERATURE: 97.7 F | DIASTOLIC BLOOD PRESSURE: 59 MMHG | HEART RATE: 70 BPM | WEIGHT: 211.8 LBS | RESPIRATION RATE: 16 BRPM

## 2021-06-05 VITALS
DIASTOLIC BLOOD PRESSURE: 60 MMHG | WEIGHT: 214.56 LBS | SYSTOLIC BLOOD PRESSURE: 130 MMHG | BODY MASS INDEX: 38.01 KG/M2 | HEART RATE: 69 BPM

## 2021-06-05 VITALS
BODY MASS INDEX: 37.55 KG/M2 | SYSTOLIC BLOOD PRESSURE: 110 MMHG | HEART RATE: 69 BPM | TEMPERATURE: 98.5 F | OXYGEN SATURATION: 97 % | WEIGHT: 212 LBS | DIASTOLIC BLOOD PRESSURE: 70 MMHG

## 2021-06-05 VITALS
WEIGHT: 215.13 LBS | HEART RATE: 78 BPM | BODY MASS INDEX: 38.11 KG/M2 | DIASTOLIC BLOOD PRESSURE: 70 MMHG | SYSTOLIC BLOOD PRESSURE: 110 MMHG

## 2021-06-05 NOTE — TELEPHONE ENCOUNTER
ANTICOAGULATION  MANAGEMENT    Assessment     Today's INR result of 2.8 is Therapeutic (goal INR of 2.0-3.0)        Warfarin taken as previously instructed    No new diet changes affecting INR    No new medication/supplements affecting INR    Continues to tolerate warfarin with no reported s/s of bleeding or thromboembolism     Previous INR was Therapeutic    Plan:     Left a detailed message for Prerna regarding INR result and instructed:     Warfarin Dosing Instructions:  Continue current warfarin dose 10 mg daily.    Instructed patient to follow up no later than: 4 weeks.    Education provided: importance of taking warfarin as instructed      Instructed to call the [facility] for any changes, questions or concerns. (#[phone])   ?   Usman Covarrubias RN    Subjective/Objective:      Prerna Pizarro, a 64 y.o. female is on warfarin.     Prerna reports:     Home warfarin dose: as updated on anticoagulation calendar per template     Missed doses: No     Medication changes:  No     S/S of bleeding or thromboembolism:  No     New Injury or illness:  No     Changes in diet or alcohol consumption:  No     Upcoming surgery, procedure or cardioversion:  No    Anticoagulation Episode Summary     Current INR goal:   2.0-3.0   TTR:   71.6 % (11.4 mo)   Next INR check:   3/5/2020   INR from last check:   2.80 (2/6/2020)   Weekly max warfarin dose:      Target end date:      INR check location:      Preferred lab:      Send INR reminders to:   LIAAG WAQAS    Indications    Factor V Leiden (H) [D68.51]           Comments:            Anticoagulation Care Providers     Provider Role Specialty Phone number    Kenya Napoles MD Referring Family Medicine [phone]

## 2021-06-05 NOTE — TELEPHONE ENCOUNTER
ANTICOAGULATION  MANAGEMENT PROGRAM    Prerna Pizarro is overdue for INR check.     First reminder letter sent.       Kassandra Hernandez

## 2021-06-05 NOTE — TELEPHONE ENCOUNTER
Refill Approved    Rx renewed per Medication Renewal Policy. Medication was last renewed on 10/10/19.    Radha Penn, [facility] Connection Triage/Med Refill 1/10/2020     Requested Prescriptions   Pending Prescriptions Disp Refills     varenicline (CHANTIX) 1 mg tablet [Pharmacy Med Name: CHANTIX 1MG TABLETS] 60 tablet 2     Sig: TAKE 1 TABLET BY MOUTH TWICE DAILY. TAKE AFTER EATING WITH A FULL GLASS OF WATER.       Varenicline Refill Protocol Passed - 1/10/2020  3:17 AM        Passed - Normal Serum Creatinine in past 12 months      Creatinine   Date Value Ref Range Status   05/26/2019 0.75 0.60 - 1.10 mg/dL Final             Passed - PCP or prescribing provider visit in last 12 or next 3 months.     Last office visit with prescriber/PCP: 10/10/2019 Kenya Napoles MD OR same dept: 10/10/2019 Kenya Napoles MD  Last physical: 2/28/2019       Next visit within 3 mo: Visit date not found  Next physical within 3 mo: Visit date not found  Prescriber OR PCP: Kenya Napoles MD  Last diagnosis associated with med order: 1. Tobacco abuse counseling  - CHANTIX 1 mg tablet [Pharmacy Med Name: CHANTIX 1MG TABLETS]; TAKE 1 TABLET BY MOUTH TWICE DAILY. TAKE AFTER EATING WITH A FULL GLASS OF WATER.  Dispense: 60 tablet; Refill: 2     Requested Prescriptions     Pending Prescriptions Disp Refills     varenicline (CHANTIX) 1 mg tablet [Pharmacy Med Name: CHANTIX 1MG TABLETS] 60 tablet 2     Sig: TAKE 1 TABLET BY MOUTH TWICE DAILY. TAKE AFTER EATING WITH A FULL GLASS OF WATER.     May refill for 3 months if protocol passes.

## 2021-06-06 NOTE — TELEPHONE ENCOUNTER
ANTICOAGULATION  MANAGEMENT PROGRAM    Perrna Pizarro is overdue for INR check.     Spoke with pt who declined to schedule INR at this time. If calling back please schedule as soon as possible.      Usman Covarrubias RN

## 2021-06-06 NOTE — TELEPHONE ENCOUNTER
Last office visit 10/10/2019  Patient was to return to clinic in 2 months.     Called patient to confirm whether or not she is taking trazodone and she reports she is still taking this medication.    She is trying to get caught up on bills therefore declines an appointment at this time but states she is doing really well and would appreciate if Dr. Napoles could refill bupropion and trazodone.

## 2021-06-07 NOTE — TELEPHONE ENCOUNTER
Last office visit for the medication was 10-10-19. She has been seen since with most recent visit on 4-17-20. Last dispensed 11-1-19.

## 2021-06-07 NOTE — TELEPHONE ENCOUNTER
Refill Approved    Rx renewed per Medication Renewal Policy. Medication was last renewed on 1/10220.    Kelly Ratliff, [facility] Triage/Med Refill 3/26/2020     Requested Prescriptions   Pending Prescriptions Disp Refills     varenicline (CHANTIX) 1 mg tablet [Pharmacy Med Name: CHANTIX 1MG TABLETS] 60 tablet 1     Sig: TAKE 1 TABLET BY MOUTH TWICE DAILY. TAKE AFTER FOOD AND WITH A FULL GLASS OF WATER       Varenicline Refill Protocol Passed - 3/26/2020  3:19 AM        Passed - Normal Serum Creatinine in past 12 months      Creatinine   Date Value Ref Range Status   05/26/2019 0.75 0.60 - 1.10 mg/dL Final             Passed - PCP or prescribing provider visit in last 12 or next 3 months.     Last office visit with prescriber/PCP: 10/10/2019 Kenya Napoles MD OR same dept: 10/10/2019 Kenya Napoles MD  Last physical: 2/28/2019       Next visit within 3 mo: Visit date not found  Next physical within 3 mo: Visit date not found  Prescriber OR PCP: Kenya Napoles MD  Last diagnosis associated with med order: 1. Tobacco abuse counseling  - CHANTIX 1 mg tablet [Pharmacy Med Name: CHANTIX 1MG TABLETS]; TAKE 1 TABLET BY MOUTH TWICE DAILY. TAKE AFTER FOOD AND WITH A FULL GLASS OF WATER  Dispense: 60 tablet; Refill: 1     Requested Prescriptions     Pending Prescriptions Disp Refills     varenicline (CHANTIX) 1 mg tablet [Pharmacy Med Name: CHANTIX 1MG TABLETS] 60 tablet 1     Sig: TAKE 1 TABLET BY MOUTH TWICE DAILY. TAKE AFTER FOOD AND WITH A FULL GLASS OF WATER     May refill for 3 months if protocol passes.

## 2021-06-07 NOTE — TELEPHONE ENCOUNTER
ANTICOAGULATION MANAGEMENT PROGRAM--Non-Compliance Chart Review    Prerna Pizarro is overdue for INR follow up.      INR Results:   Lab Results   Component Value Date    INR 2.80 (H) 02/06/2020    INR 2.30 (H) 12/17/2019    INR 2.90 (H) 12/10/2019         Chart reviewed, continue non-compliance protocol       Susan Estrada RN

## 2021-06-07 NOTE — PROGRESS NOTES
"Prerna Pizarro is a 64 y.o. female who is being evaluated via a billable telephone visit.      The patient has been notified of following:     \"This telephone visit will be conducted via a call between you and your physician/provider. We have found that certain health care needs can be provided without the need for a physical exam.  This service lets us provide the care you need with a short phone conversation.  If a prescription is necessary we can send it directly to your pharmacy.  If lab work is needed we can place an order for that and you can then stop by our lab to have the test done at a later time.    Telephone visits are billed at different rates depending on your insurance coverage. During this emergency period, for some insurers they may be billed the same as an in-person visit.  Please reach out to your insurance provider with any questions.    If during the course of the call the physician/provider feels a telephone visit is not appropriate, you will not be charged for this service.\"    Patient has given verbal consent to a Telephone visit? Yes  [phone]  Patient would like to receive their AVS by AVS Preference: Lupe.    Additional provider notes: Sen is evaluated today via telephone encounter for evaluation of increased symptoms of depression.  She does have a history of depression.  She is currently on bupropion extended release 300 mg daily.  She also takes trazodone 50 mg at bedtime to help with sleep.  We reviewed her medications allergies.  She reports that over the past 3 weeks or so she has noticed significant increase in her depressive symptoms.  Feels down in the dumps.  She is tired.  She is not sleeping well.  Has difficulty falling asleep and then when she does her sleep is broken.  She was laid off permanently in early March.  She lives alone so does feel isolated.  She is starting to look for new jobs and is working on a resume.  She has been trying to keep in contact with " from family.  She has also been doing other things to try to get over this lump.  Reports she has tried yoga, walking and dietary changes.  These have been ineffective.  She denies any thoughts of suicide or harming herself.  She has never done counseling in the past but does feel that would be something she might be interested in.  Review of her record shows that in the past has taken sertraline and citalopram.  She reports that these medications were ineffective.  She also reports that with her last dose increase of bupropion to 300 mg that she did not notice much of an improvement in her depressive symptoms.  She has no other concerns or questions.  Review systems is assessed and is otherwise negative.    Little interest or pleasure in doing things: Nearly every day  Feeling down, depressed, or hopeless: Nearly every day  Trouble falling or staying asleep, or sleeping too much: Nearly every day  Feeling tired or having little energy: Nearly every day  Poor appetite or overeating: Nearly every day  Feeling bad about yourself - or that you are a failure or have let yourself or your family down: Nearly every day  Trouble concentrating on things, such as reading the newspaper or watching television: Nearly every day  Moving or speaking so slowly that other people could have noticed. Or the opposite - being so fidgety or restless that you have been moving around a lot more than usual: Not at all  Thoughts that you would be better off dead, or of hurting yourself in some way: Not at all  PHQ-9 Total Score: 21  If you checked off any problems, how difficult have these problems made it for you to do your work, take care of things at home, or get along with other people?: Extremely dIfficult     TAYA-7 Screening Results:  Feeling nervous, anxious or on edge: 3 (4/17/2020  3:00 PM)  Not being able to stop or control worry: 3 (4/17/2020  3:00 PM)  Worrying too much about different things: 3 (4/17/2020  3:00 PM)  Trouble  relaxin (2020  3:00 PM)  Being so restless that is is hard to sit still: 3 (2020  3:00 PM)  Becoming easily annnoyed or irritable: 1 (2020  3:00 PM)  Feeling afraid as if something awful might happen: 0 (2020  3:00 PM)  TAYA-7 Total: 15 (2020  3:00 PM)    Assessment/Plan:  1. Severe major depression (H)  Provided emotional support.  Discussed some treatment options.  Discussed that we could increase the dose of program to 450 mg daily.  She is a little hesitant to do that since she did not notice much improvement after the last dosage increase.  We discussed we could try adding in another medication for treatment of depression that works in a different way to treat her depression.  Previously tried Celexa and Zoloft without much benefit.  We discussed that may be a trial of venlafaxine would be in his choice.  She is agreeable to this.  Counseled her on use of medication and side effects.  Prescription sent to pharmacy with directions to take 37.5 mg daily for 1 week and then increase to 75 g daily.  Also strongly encouraged counseling and she was amenable to that.  Referral placed for mental health.  Encouraged her to follow-up visit with PCP in approximately 1 month.  - venlafaxine (EFFEXOR XR) 37.5 MG 24 hr capsule; Take 1 capsule by mouth daily for 1 week and then increase to 2 capsules daily.  Dispense: 60 capsule; Refill: 1  - AMB REFERRAL TO MENTAL HEALTH AND ADDICTION  - Adult (18+); Outpatient Treatment; Individual/Couples/Family/Group Therapy/Health Psychology; [facility] [phone]    2. Insomnia  We will try increasing trazodone to 50 mg daily.  - traZODone (DESYREL) 50 MG tablet; Take 2 tablets (100 mg total) by mouth at bedtime.  Dispense: 90 tablet; Refill: 1        Phone call duration:  23 minutes    Ruma Cervantes MD

## 2021-06-07 NOTE — TELEPHONE ENCOUNTER
ANTICOAGULATION  MANAGEMENT PROGRAM    Prerna Pizarro is overdue for INR check.     First reminder letter sent.       Susan Estrada RN

## 2021-06-08 NOTE — TELEPHONE ENCOUNTER
Anticoagulation clinic notificiation    Dr. Napoles,    Your patient, Prerna Pizarro,  is past due for an INR check  The patient s last INR was 2.8 on 2/6/2020 and was due to come back on 3/5/2020.    Prerna received two phone calls and one letter over the last several weeks in attempt to arrange a follow up appointment.  Prerna will be sent another letter today.     No action is required from you unless you have additional information or if you would like to reach out personally to Prerna.    Please don t hesitate to contact the Anticoagulation Management Program at [phone] if you have any questions or concerns.     Sincerely,     [facility] Anticoagulation Management Program

## 2021-06-09 NOTE — TELEPHONE ENCOUNTER
Left message to call back for: pt  Information to relay to patient:  Left message to call and schedule virtual visit with dr grayson before next refill request.

## 2021-06-09 NOTE — TELEPHONE ENCOUNTER
Refill sent today. Pt due for med check before any further refills can be given. Please call to schedule virtual visit

## 2021-06-09 NOTE — TELEPHONE ENCOUNTER
Left message to call back for: PT  Information to relay to patient:  2nd message to call and schedule virtual visit with dr grayson before next refill request..

## 2021-06-09 NOTE — TELEPHONE ENCOUNTER
Called pt to schedule virtual visit. She is concerned about the cost of the visit and is not able to schedule at this time. She is going to contact our billing dept and her insurance company to see how much will be covered. Pt stated she will call back when able to make appt.

## 2021-06-10 NOTE — PROGRESS NOTES
Assessment/Plan:        Diagnoses and all orders for this visit:    Cervical radiculopathy  -     MR Cervical Spine Without Contrast; Future; Expected date: 4/20/17    Insomnia  -     traZODone (DESYREL) 50 MG tablet; TAKE 1 TABLET BY MOUTH EVERY NIGHT AT BEDTIME.  Dispense: 90 tablet; Refill: 0    DVT (deep venous thrombosis)  -     INR    Major depressive disorder, recurrent episode    Other orders  -     citalopram (CELEXA) 20 MG tablet; Take half a tablet orally daily for 6 days then 1 tablet daily thereafter  Dispense: 30 tablet; Refill: 0  -     buPROPion (WELLBUTRIN XL) 300 MG 24 hr tablet; Take 1 tablet (300 mg total) by mouth every morning.  Dispense: 90 tablet; Refill: 0        For depression, continue with the Wellbutrin  mg daily.  Will add citalopram 10 mg daily for 6 days and 20 mg daily thereafter.  Follow-up in 1 month.  She is to continue with the trazodone.  Revisit her smoking on her next visit if still ongoing or persistent.  Recheck her INR in follow-up with [facility] for this.  For her neck pain with radiculopathy, will do an MRI of her cervical spine.  If open MRI is not covered, we will premedicate her with Xanax prior to her MRI.  Await results prior to further recommendations.  She was agreeable with the plans.  Subjective:    Patient ID: Prerna Pizarro is a 61 y.o. female.    ANNI Graff is here for follow-up regarding her depression, INR.  She has been undergoing a lot of stress at this time.  She has a busy work schedule, 5 days a week, tax season.  She hopes to cut down to 3 days a week.  A lot of stress at work and with coworkers.  Boss has cancer, best friend is dealing with terminal cancer and has been difficult for her.  Dealing with issues with her son as well.  Depression and anxiety has been a bit better with increasing the Wellbutrin XL dosage to 300 mg but still uncontrolled.  Denies any thoughts of hurting herself or ending life.  Has not been eating good and hopes to improve  on it.  Reflux has been acting up.  He is not sleeping well.  She ran out of her trazodone 3 days ago and noted the change.  She has been waking up frequently, tossing and turning.  With the trazodone, she was able to have at least 6-7 hours of sleep at night.  She hopes to cut down on her work hours, take a trip with her friend to [place] and provide more time for herself.  She hopes that with the spring season, she has more time outdoors which helps for her motions.  Denies any intake of alcohol.  Continues to be sober.  Times where she feels more upset, down, hopeless.  She tries to continue with her AA meetings, attends once a week.  She also holds onto her mounika.  She hopes that it resolves eventually with time.  Smoking half a pack per day or more depending on stressors for the day. PHQ9 score of 8.     History of DVT and overdue for INR check.  She has not been able to come in because of her insurance change.  She waited until her Medicare kicked in.  Has been able to take her Coumadin 10 mg daily.  Denies any gross bleeding.  Has not been as consistent with her greens.    Has been noticing neck pain for the past couple of months.  Mostly in her neck radiating to her shoulder, shoulder blades, right upper extremity.  Feels weak and has been dropping small objects such as her pain, lipstick a couple of times per week.  Denies any trauma, injury.  No history of surgery to her shoulder before.  She had C1-2 surgery for bulging disc.  She also has degenerative disc disease.  This was done 25-30 years ago.  Has been taking Aleve as needed for pain and tries to limit as she knows effects with Coumadin.  Tylenol does not provide any benefit at all for her.  Pain is also causing headaches.    Review of Systems  As above otherwise negative.          Objective:    Physical Exam  /70 (Patient Site: Left Arm, Patient Position: Sitting, Cuff Size: Adult Large)  Pulse 81  Wt 204 lb 14.4 oz (92.9 kg)  SpO2 96%   Breastfeeding? No  BMI 36.3 kg/m2    Vital signs noted above. AAO ×3.  HEENT no nasal discharge, moist oral mucosa. Neck: Supple neck, nonpalpable cervical lymph nodes.  Lungs: Clear to auscultation bilateral.  Heart: S1-S2 regular rate and rhythm, no murmurs were noted.  Abdomen:  with bowel sounds.  Extremities: No edema, pulses were full and equal.  No pain in her legs with pressure application.  Motor 5/5 on her upper extremities, no limitation in range of motion. Pain right shoulder with ROM. Negative drop can test.

## 2021-06-10 NOTE — TELEPHONE ENCOUNTER
Refill Approved    Rx renewed per Medication Renewal Policy. Medication was last renewed on 4/17/20.    Kelly Ratliff, [facility] Triage/Med Refill 7/28/2020     Requested Prescriptions   Pending Prescriptions Disp Refills     traZODone (DESYREL) 50 MG tablet [Pharmacy Med Name: TRAZODONE 50MG TABLETS] 90 tablet 1     Sig: TAKE 1 TABLET(50 MG) BY MOUTH AT BEDTIME       Tricyclics/Misc Antidepressant/Antianxiety Meds Refill Protocol Passed - 7/26/2020  5:41 AM        Passed - PCP or prescribing provider visit in last year     Last office visit with prescriber/PCP: 10/10/2019 Kenya Napoles MD OR same dept: 10/10/2019 Kenya Napoles MD OR same specialty: 10/10/2019 Kenya Napoles MD  Last physical: 2/28/2019 Last MTM visit: Visit date not found   Next visit within 3 mo: Visit date not found  Next physical within 3 mo: Visit date not found  Prescriber OR PCP: Kenya Napoles MD  Last diagnosis associated with med order: 1. Insomnia  - traZODone (DESYREL) 50 MG tablet [Pharmacy Med Name: TRAZODONE 50MG TABLETS]; TAKE 1 TABLET(50 MG) BY MOUTH AT BEDTIME  Dispense: 90 tablet; Refill: 1    If protocol passes may refill for 12 months if within 3 months of last provider visit (or a total of 15 months).

## 2021-06-10 NOTE — PROGRESS NOTES
"Prerna Pizarro is a 64 y.o. female who is being evaluated via a billable telephone visit.      The patient has been notified of following:     \"This telephone visit will be conducted via a call between you and your physician/provider. We have found that certain health care needs can be provided without the need for a physical exam.  This service lets us provide the care you need with a short phone conversation.  If a prescription is necessary we can send it directly to your pharmacy.  If lab work is needed we can place an order for that and you can then stop by our lab to have the test done at a later time.    Telephone visits are billed at different rates depending on your insurance coverage. During this emergency period, for some insurers they may be billed the same as an in-person visit.  Please reach out to your insurance provider with any questions.    If during the course of the call the physician/provider feels a telephone visit is not appropriate, you will not be charged for this service.\"    Patient has given verbal consent to a Telephone visit? Yes    What phone number would you like to be contacted at? [phone]    Patient would like to receive their AVS by AVS Preference: Lupe.    Additional provider notes: The patient is calling for refill of her psychiatric medications including venlafaxine bupropion trazodone and she also needs Chantix.  The reason the patient is concerned is that she has had rather classical cold-like symptoms including myalgia cough conjunctivitis loose stools.  A COVID-19 test is pending and will be done today at 3:00.  Patient is concerned she will be locked up for 14 days and run out of her psychiatric medications.  She also is no longer eligible for telephone visit because she is reached her maximum.  She will have to pay out-of-pocket for any mental counseling.  Hopefully as we discussed on the phone today by this telephone visit that the psychiatric medications including " the addition of venlafaxine is substituted for other SSRIs remains and continues to be most effective for her.  I have done a review of system I reviewed her chart.  All medical questions asked were answered.  This was a 13-minute telephone visit    Assessment/Plan:  1. Moderate episode of recurrent major depressive disorder (H)  Renew the following  - buPROPion (WELLBUTRIN XL) 300 MG 24 hr tablet; TAKE 1 TABLET(300 MG) BY MOUTH DAILY  Dispense: 90 tablet; Refill: 1    2. Tobacco abuse counseling  Renew the following  - varenicline (CHANTIX) 1 mg tablet; TAKE 1 TABLET BY MOUTH TWICE DAILY. TAKE AFTER FOOD AND WITH A FULL GLASS OF WATER  Dispense: 180 tablet; Refill: 1    3. Severe major depression (H)  Continue same dosage  - venlafaxine (EFFEXOR XR) 37.5 MG 24 hr capsule; Take 1 capsule by mouth daily for 1 week and then increase to 2 capsules daily.  Dispense: 180 capsule; Refill: 1    4. Insomnia  I would suggest continuing trazodone at same dosage she does not need a refill on that today        Phone call duration:  13 minutes    Dat Schmidt MA

## 2021-06-11 ENCOUNTER — COMMUNICATION - HEALTHEAST (OUTPATIENT)
Dept: ANTICOAGULATION | Facility: CLINIC | Age: 66
End: 2021-06-11

## 2021-06-12 NOTE — TELEPHONE ENCOUNTER
Anticoagulation Management    Unable to reach Prerna today.    Today's INR result of 4.2 is Supratherapeutic (goal INR of 2.0-3.0).     Follow up required to discuss dosing instructions and confirm understanding of instructions.    Left message to hold warfarin tonight.       ACN to follow up    Usman Covarrubias RN

## 2021-06-12 NOTE — TELEPHONE ENCOUNTER
Appointment at 4:00pm today to have INR done at [place] .    Asking for refill of Warfarin.  RN cannot approve Refill Request    RN can NOT refill this medication med is not covered by policy/route to provider. Last office visit: Visit date not found Last Physical: Visit date not found Last MTM visit: Visit date not found Last visit same specialty: Visit date not found.  Next visit within 3 mo: Visit date not found  Next physical within 3 mo: Visit date not found      Precious Pantoja, [facility] Triage/Med Refill 10/27/2020    Requested Prescriptions   Pending Prescriptions Disp Refills     warfarin ANTICOAGULANT (COUMADIN/JANTOVEN) 10 MG tablet 90 tablet 1     Sig: TAKE 1 TABLET BY MOUTH DAILY       There is no refill protocol information for this order

## 2021-06-13 NOTE — TELEPHONE ENCOUNTER
Anticoagulation Management    Unable to reach Prerna today.    Today's INR result of 2.7 is Therapeutic (goal INR of 2.0-3.0).     Follow up required to discuss Bactrim and effect on INR.    Left message to take reduced dose of warfarin, 5 mg tonight.       ACN to follow up    Sarah Goodwin, RAI
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
## 2021-06-13 NOTE — PROGRESS NOTES
Assessment/Plan:    1. Infected sebaceous cyst  Overall cyst appears to be healing well - area is  and indurated but redness and size have improved; no active bleeding at this time. Wound was repacked and bandaged. Given location and pt living alone she is unable to complete wound cares for this, recommend home health nurse and pt agrees as long as it is covered by insurance (or partially covered and low cost to her) - referral placed. Pt needs daily wound care. Discussed use of tylenol as needed for pain and refilled tylenol #3 for significant pain and to help her sleep at night; pt will discard tramadol as this was not working. Follow up next week, though pt can cancel if healing well and home care is assisting her.  - Ambulatory referral to Home Health  - acetaminophen-codeine (TYLENOL #3) 300-30 mg per tablet; Take 1 tablet by mouth every 6 (six) hours as needed for pain.  Dispense: 10 tablet; Refill: 0      Follow up: 1 week as needed    Kristyn Bañuelos MD  [facility]    Subjective:    Patient ID: Prerna Pizarro is a 64 y.o. female is here today for f/u I&D     F/u I&D  -I saw pt last week for infected sebaceous cyst, unfortunately I&D unsuccessful that day (too early/purulent material not consolidated)  -pt saw Dr Hutson yesterday, I&D successful, given size will allow to heal via secondary intention with packing   -here today to f/u to change packing and assure no issues with bleeding (given on warfarin) or other   -pt had neighbor assist with rebandaging - no blood on shirt  -having some itchiness from bandaging  -  -no fever  -Dr Hutson sent in some tramadol which pt said isn't helpful, says the tylenol #3 worked better for significant pain and especially to help reduce pain so she could sleep      Patient Active Problem List   Diagnosis     Paroxysmal Supraventricular Tachycardia     Insomnia     Major Depression, Recurrent     Venous Thrombosis Of The Saphenous Vein      Colonic Diverticulosis     Anxiety     Factor V Leiden (H)     History of DVT (deep vein thrombosis)     Deep vein thrombosis (DVT) of lower extremity (H)     Primary osteoarthritis involving multiple joints     Obesity (BMI 35.0-39.9) with comorbidity (H)     Tobacco abuse counseling     Closed Colles' fracture of left radius     Alcoholism (H)     Past Medical History:   Diagnosis Date     Anxiety      Depression      ETOH abuse      Factor V Leiden (H)      Ovarian mass      Seasonal allergies      Past Surgical History:   Procedure Laterality Date     ABDOMINAL SURGERY       APPENDECTOMY       MI  DELIVERY ONLY      Description:  Section;  Recorded: 2011;     MI DILATION/CURETTAGE,DIAGNOSTIC      Description: Dilation And Curettage;  Recorded: 2011;     MI GASTRIC BYPASS,OBESE<150CM NII-EN-Y      Description: Gastric Surgery For Morbid Obesity Bypass With Nii-en-Y;  Recorded: 2011;     MI INCISE SPINAL CORD TRACT(S)      Description: Laminectomy With Myelotomy Cervical;  Recorded: 2011;     MI REVISE MEDIAN N/CARPAL TUNNEL SURG Right 2019    Procedure: RELEASE, CARPAL TUNNEL;  Surgeon: Randolph Hernandez MD;  Location: [facility];  Service: Hand     TRANSURETHRAL RESECTION OF BLADDER TUMOR       Current Outpatient Medications on File Prior to Visit   Medication Sig Dispense Refill     buPROPion (WELLBUTRIN XL) 300 MG 24 hr tablet TAKE 1 TABLET(300 MG) BY MOUTH DAILY 90 tablet 1     multivitamin with minerals (THERA-M) 9 mg iron-400 mcg Tab tablet Take 1 tablet by mouth at bedtime.              sulfamethoxazole-trimethoprim (SEPTRA DS) 800-160 mg per tablet Take 1 tablet by mouth 2 (two) times a day for 10 days. 20 tablet 0     traZODone (DESYREL) 50 MG tablet TAKE 1 TABLET(50 MG) BY MOUTH AT BEDTIME 90 tablet 2     varenicline (CHANTIX) 1 mg tablet TAKE 1 TABLET BY MOUTH TWICE DAILY. TAKE AFTER FOOD AND WITH A FULL GLASS OF WATER 180 tablet 1      venlafaxine (EFFEXOR XR) 37.5 MG 24 hr capsule Take 1 capsule by mouth daily for 1 week and then increase to 2 capsules daily. 180 capsule 1     warfarin ANTICOAGULANT (COUMADIN/JANTOVEN) 10 MG tablet TAKE 1 TABLET BY MOUTH DAILY 90 tablet 1     No current facility-administered medications on file prior to visit.      Allergies   Allergen Reactions     Contrast [Iohexol] Anaphylaxis     Diatrizoate Meglumine Anaphylaxis and Dizziness     Iodine Anaphylaxis     contrast     Tetracyclines Unknown     Over 30 years ago, no throat swelling     Social History     Socioeconomic History     Marital status: Single     Spouse name: Not on file     Number of children: Not on file     Years of education: Not on file     Highest education level: Not on file   Occupational History     Not on file   Social Needs     Financial resource strain: Not on file     Food insecurity     Worry: Not on file     Inability: Not on file     Transportation needs     Medical: Not on file     Non-medical: Not on file   Tobacco Use     Smoking status: Current Every Day Smoker     Packs/day: 0.75     Years: 37.00     Pack years: 27.75     Types: Cigarettes     Last attempt to quit: 2019     Years since quittin.5     Smokeless tobacco: Never Used     Tobacco comment: smoking 1-2 cigarettes per day as of 20   Substance and Sexual Activity     Alcohol use: No     Comment: Recovering alcoholic. Last drink was 13     Drug use: No     Sexual activity: Not Currently     Partners: Male     Birth control/protection: Post-menopausal   Lifestyle     Physical activity     Days per week: Not on file     Minutes per session: Not on file     Stress: Not on file   Relationships     Social connections     Talks on phone: Not on file     Gets together: Not on file     Attends Yarsanism service: Not on file     Active member of club or organization: Not on file     Attends meetings of clubs or organizations: Not on file     Relationship status:  Not on file     Intimate partner violence     Fear of current or ex partner: Not on file     Emotionally abused: Not on file     Physically abused: Not on file     Forced sexual activity: Not on file   Other Topics Concern     Not on file   Social History Narrative     Not on file     Family History   Problem Relation Age of Onset     Brain cancer Father      Varicose Veins Father      Cancer Sister         ovarian     Esophageal cancer Mother      Alcohol abuse Mother      Cancer Mother         throat     Alcohol abuse Brother      No Medical Problems Brother      No Medical Problems Brother      No Medical Problems Brother      Review of systems is as stated in HPI, and the remainder of system review is otherwise negative.    Objective:      /60   Pulse 69   Wt 214 lb 9 oz (97.3 kg)   BMI 38.01 kg/m      General appearance: awake, NAD  HEENT: atraumatic, normocephalic, no scleral icterus or injection  Lungs: breathing comfortably on room air  Extremities: moving all extremities  Skin: bandage from yesterday removed (gauze was quite saturated with blood/serous/purulent material, packing removed as well), no active bleeding, cyst is mostly flat now, area is still quite tender and indurated from inflammation/infection, packing and bandage redone  Neuro: alert, oriented x3, CNs grossly intact, no focal deficits appreciated  Psych: normal mood/affect/behavior, answering questions appropriately, linear thought process

## 2021-06-13 NOTE — TELEPHONE ENCOUNTER
Reason contacted:  Wound care  Information relayed:  Spoke with patient who reports a friend was to come over yesterday and help change her bandage for her but she never showed up.     Patient reports she did her best to change her own bandage. When she removed the bandage a lot of the packing came out of the wound. She states approximately 5 inches came out.   She states when she removed the bandage a lot of drainage fell down her back.   She states the drainage was clear, thick, with a little bit of blood mixed in and did not have a foul odor.     She states home care is not covered for her and it is approximately 200 dollars per visit.      Patient is wondering if the wound needs to be repacked given some of the packing came out when she did her bandage change?   Patient has a friend that she could drive to have her bandage changed.     Please review and advise.    Additional questions:  No  Further follow-up needed:  Yes  Okay to leave a detailed message:  Yes

## 2021-06-13 NOTE — PATIENT INSTRUCTIONS - HE
Unable to drain cyst today - not yet ready unfortunately  Start antibiotic two times a day for 10 days  10 tablets of tylenol #3 sent in for pain control since we can't use NSAIDs like ibuprofen/alleve  Follow up next week for recheck if needed

## 2021-06-13 NOTE — PATIENT INSTRUCTIONS - HE
Referral for home nurse placed - they will check with your insurance regarding coverage and cost  Plan follow up next week if needed, but if doing well with the nurse and healing then ok to cancel

## 2021-06-13 NOTE — TELEPHONE ENCOUNTER
Can someone call and ask pt how she is doing with her wound cares? If she is needing assistance I would recommend getting her set up with our wound clinic.    Thanks  Dr Bañuelos

## 2021-06-13 NOTE — PROGRESS NOTES
Assessment/Plan:    1. Infected sebaceous cyst  Infected sebaceous cyst.  Doing well currently.  Packing removed.  Okay for triple antibiotic and Band-Aid topically.  Anticipate ongoing improvement over next 2 weeks.  Completes course of Bactrim double strength as noted.  Notify of recurrent concerns or worsening.        Subjective:    Prerna Pizarro is seen today for follow-up treatment evaluation.  Sebaceous cyst noted posterior thoracic lumbar spine region.  Ongoing issues over past week initial attempts at I&D not successful.  Subsequently had I&D completed 2020 with success.  Now has had wound packing performed.  Did have drainage last evening from incision site that ran down her back otherwise no fevers.  Less tenderness noted.  Completing Bactrim double strength.      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[employer])  Hobbies: fishing; walking; reading (fiction > nonfiction)    Past Surgical History:   Procedure Laterality Date     ABDOMINAL SURGERY       APPENDECTOMY       ME  DELIVERY ONLY      Description:  Section;  Recorded: 2011;     ME DILATION/CURETTAGE,DIAGNOSTIC      Description: Dilation And Curettage;  Recorded: 2011;     ME GASTRIC BYPASS,OBESE<150CM DIONICIO-EN-Y      Description: Gastric Surgery For Morbid Obesity Bypass With Dionicio-en-Y;  Recorded: 2011;     ME INCISE SPINAL CORD TRACT(S)      Description: Laminectomy With Myelotomy Cervical;  Recorded: 2011;     ME REVISE MEDIAN N/CARPAL TUNNEL SURG Right 2019    Procedure: RELEASE, CARPAL TUNNEL;  Surgeon: Randolph Hernandez MD;  Location: [facility];  Service: Hand     TRANSURETHRAL  RESECTION OF BLADDER TUMOR          Family History   Problem Relation Age of Onset     Brain cancer Father      Varicose Veins Father      Cancer Sister         ovarian     Esophageal cancer Mother      Alcohol abuse Mother      Cancer Mother         throat     Alcohol abuse Brother      No Medical Problems Brother      No Medical Problems Brother      No Medical Problems Brother         Past Medical History:   Diagnosis Date     Anxiety      Depression      ETOH abuse      Factor V Leiden (H)      Ovarian mass      Seasonal allergies         Social History     Tobacco Use     Smoking status: Current Every Day Smoker     Packs/day: 0.75     Years: 37.00     Pack years: 27.75     Types: Cigarettes     Last attempt to quit: 2019     Years since quittin.5     Smokeless tobacco: Never Used     Tobacco comment: smoking 1-2 cigarettes per day as of 19   Substance Use Topics     Alcohol use: No     Comment: Recovering alcoholic. Last drink was 13     Drug use: No        Current Outpatient Medications   Medication Sig Dispense Refill     acetaminophen-codeine (TYLENOL #3) 300-30 mg per tablet Take 1 tablet by mouth every 6 (six) hours as needed for pain. 10 tablet 0     buPROPion (WELLBUTRIN XL) 300 MG 24 hr tablet TAKE 1 TABLET(300 MG) BY MOUTH DAILY 90 tablet 1     multivitamin with minerals (THERA-M) 9 mg iron-400 mcg Tab tablet Take 1 tablet by mouth at bedtime.              sulfamethoxazole-trimethoprim (SEPTRA DS) 800-160 mg per tablet Take 1 tablet by mouth 2 (two) times a day for 10 days. 20 tablet 0     traZODone (DESYREL) 50 MG tablet TAKE 1 TABLET(50 MG) BY MOUTH AT BEDTIME 90 tablet 2     varenicline (CHANTIX) 1 mg tablet TAKE 1 TABLET BY MOUTH TWICE DAILY. TAKE AFTER FOOD AND WITH A FULL GLASS OF WATER 180 tablet 1     venlafaxine (EFFEXOR XR) 37.5 MG 24 hr capsule Take 1 capsule by mouth daily for 1 week and then increase to 2 capsules daily. 180 capsule 1     warfarin ANTICOAGULANT  (COUMADIN/JANTOVEN) 10 MG tablet TAKE 1 TABLET BY MOUTH DAILY 90 tablet 1     No current facility-administered medications for this visit.           Objective:    Vitals:    12/17/20 1559   BP: 110/70   Pulse: 69   Temp: 98.5  F (36.9  C)   SpO2: 97%   Weight: 212 lb (96.2 kg)      Body mass index is 37.55 kg/m .    Alert.  No apparent distress.  Incision site to lower back with surrounding induration of skin with no significant acute inflammation however.  Scant drainage at incision site with expression attempts.  No significant acute purulence.      This note has been dictated using voice recognition software and as a result may contain minor grammatical errors and unintended word substitutions.

## 2021-06-13 NOTE — PROGRESS NOTES
"Anticoagulation Annual Referral Renewal Review    Prerna Pizarro's chart reviewed for annual renewal of referral to anticoagulation monitoring.        Criteria for anticoagulation nurse and/or pharmacist renewal met   Warfarin indication: Hypercoagulable state: Factor V Leiden Yes, per indication   Current with INR monitoring/compliant No No   Date of last office visit 7/29/20 Yes, had office visit within last year   Time in Therapeutic Range (TTR) 59 % No, TTR < 60 %       Prerna Pizarro did NOT meet all criteria for anticoagulation management program initiated renewal and requires provider review. Using dot phrase, \".acmrenewalprovider\", please advise if [facility] anticoagulation management referral should be renewed or if patient should be seen in office to review anticoagulation therapy      Susan Estrada RN  10:49 AM        "

## 2021-06-13 NOTE — TELEPHONE ENCOUNTER
We received a home care referral for this patient. Due to our capacity, this referral was vended to [facility] nursing services. Thank you!

## 2021-06-13 NOTE — TELEPHONE ENCOUNTER
Left message to call back for: wound care   Information to relay to patient:  Below message.     I can speak with patient when she calls back

## 2021-06-13 NOTE — PROGRESS NOTES
Assessment/Plan:        Diagnoses and all orders for this visit:    Finger pain  -     XR Finger Right 2 or More VWS; Future; Expected date: 10/19/17  -     HM1(CBC and Differential)  -     Rheumatoid Factor Quant  -     Antinuclear Antibody (HELEN) Cascade  -     Erythrocyte Sedimentation Rate  -     C-Reactive Protein  -     HM1 (CBC with Diff)    Factor V Leiden  -     INR    History of DVT (deep vein thrombosis)  -     INR    Major depressive disorder, recurrent episode    Other orders  -     MULTIVIT/IRON/FA/K/HERB NO.244 (ALIVE WOMEN'S ENERGY ORAL); Take by mouth.  -     cephalexin (KEFLEX) 500 MG capsule; Take 1 capsule (500 mg total) by mouth 3 (three) times a day for 10 days.  Dispense: 40 capsule; Refill: 0  -     hydrOXYzine (ATARAX) 25 MG tablet; Take 1 tablet (25 mg total) by mouth every 8 (eight) hours as needed for anxiety.  Dispense: 30 tablet; Refill: 0        Discussed differentials.  Could be a component of arthritis.  But with swelling and erythema, will treat initially with Keflex.  We will also do labs.  X-ray was done of her right fifth finger showed osteoarthritis of her right fifth DIP region.  No fractures that I can see at this time are foreign bodies.  Will send that to radiology for final read.  Await lab results prior to further recommendations.  INR was also done and came back low at 1.6.  We will have her take 15 mg of her Coumadin daily for 2 days then decrease back to 10 mg daily as before.  Recheck her INR in 2 weeks which is also the time that her antibiotic will be done.  For depression, continue with her Wellbutrin.  Increase the citalopram to 40 mg daily.  Add hydroxyzine as needed.  Continue to reach out to her friends, coworkers.  Update me and 2-3 weeks.  She was agreeable with the plans.    40 minutes spent with more than 50% in counseling discussion of treatment plans.  Subjective:    Patient ID: Prerna Pizarro is a 61 y.o. female.    HPI    Prerna is here with concerns about  swelling in her right fifth finger.  She recalls of an injury 1 and half years ago.  She was trying to pull something in the cupboard in her kitchen.  She lost balance and was trying to prevent her fall with her left hand outstretched.  She may have jammed her right fifth finger on a surface.  It was sore at that time but improved eventually.  She started noticing more discomfort, difficulty bending her right fifth finger last April 2017.  He feels warm to touch, more swollen recently especially in the past 4-5 days.  Sore even to touch.  If she tries to press on it, would sometimes notice some calcified object that would be on the surface.  She has been able to remove fragments which she describes as fingernail like in structure.  Has been able to do this 5-6 times.  No fractures or surgeries on her right hand before.  She is right-handed.  Limited because of it.    Overdue for INR.  She denies any bleeding episodes.  Has been taking her Coumadin 10 mg daily.  History of DVT, factor V Leiden deficiency.    She has not been doing well emotionally at this time.  Her close friend who was diagnosed with brain cancer is not doing good.  Was given 2 months to live.  Was hospitalized again yesterday.  Her friend has 2 kids of her own who are grown.  Limited support from her friends family.  She is trying to be brief for her friend and try to keep a strong front.  Once she is on her own, she is emotional, crying.  Feels anxious almost to the point of panic attacks.  She has fluttery sensations in her chest, difficulty with sleep.  Trying to keep up with her AA meetings every Thursdays.  She is scared that she will go back into her previous alcohol addiction.  Has been able to diverge her focus so far.  She tries to reach out to her coworkers, friends who have been supportive.  Denies any thoughts of hurting herself or ending life.  Tried to take care of her health as well.  Will be seeing a dentist shortly and plans for  dental surgery.  She plans to quit smoking as well.  She started her vacation yesterday from work but her friend had to be hospitalized.  She is trying to spend much time with her.  Her father was diagnosed with brain cancer before and she knows the course of the disease.  It is hard for her to see her friend go through this.  Has been taking her Wellbutrin 300 mg daily, citalopram 20 mg daily.    Review of Systems  As above otherwise negative.          Objective:    Physical Exam  /74  Pulse 80  Wt 208 lb 1.6 oz (94.4 kg)  Breastfeeding? No  BMI 35.72 kg/m2    Vital signs noted above. AAO ×3.  HEENT no nasal discharge, moist oral mucosa. Neck: Supple neck, nonpalpable cervical lymph nodes.  Lungs: Clear to auscultation bilateral.  Heart: S1-S2 regular rate and rhythm, no murmurs were noted.  Abdomen:  with bowel sounds.  Extremities: No edema, pulses were full and equal.  Swelling noted in her right fifth finger, distal.  Tenderness to palpation especially in the DIP region.  No discharge, bleeding.  Erythema.

## 2021-06-13 NOTE — TELEPHONE ENCOUNTER
ANTICOAGULATION  MANAGEMENT    Assessment     Today's INR result of 2.5 is Therapeutic (goal INR of 2.0-3.0)        Warfarin taken as previously instructed    No new diet changes affecting INR    No new medication/supplements affecting INR    Continues to tolerate warfarin with no reported s/s of bleeding or thromboembolism     Previous INR was Therapeutic    Plan:     Spoke on phone with Prerna regarding INR result and instructed:     Warfarin Dosing Instructions:  Temporary dose reduction while on Bactrim until 12/19 5 mg daily on Mon, Thu, Sat; and 10 mg daily rest of week   Resume home dose on 12/20 (5 mg every Thu; 10 mg AOD)    Instructed patient to follow up no later than: by 12/23    Education provided: target INR goal and significance of current INR result, importance of following up for INR monitoring at instructed interval, importance of taking warfarin as instructed, potential interaction between warfarin and bactrim and importance of notifying clinic for changes in medications    Prerna verbalizes understanding and agrees to warfarin dosing plan.    Instructed to call the [facility] for any changes, questions or concerns. (#[phone])   ?   Sarah Goodwin RN    Subjective/Objective:      Prerna Pizarro, a 64 y.o. female is on warfarin.     Prerna reports:     Home warfarin dose: verbally confirmed home dose with Prerna and updated on anticoagulation calendar     Missed doses: No     Medication changes:  Yes: bactrim until 12/19     S/S of bleeding or thromboembolism:  No     New Injury or illness:  No     Changes in diet or alcohol consumption:  No     Upcoming surgery, procedure or cardioversion:  No    Anticoagulation Episode Summary     Current INR goal:  2.0-3.0   TTR:  77.3 % (3.4 mo)   Next INR check:  12/23/2020   INR from last check:  2.50 (12/14/2020)   Weekly max warfarin dose:     Target end date:     INR check location:     Preferred lab:     Send INR reminders to:  YURI ALAN     Indications    Factor V Leiden (H) [D68.51]           Comments:           Anticoagulation Care Providers     Provider Role Specialty Phone number    Kenya Napoles MD Referring Family Medicine [phone]

## 2021-06-13 NOTE — TELEPHONE ENCOUNTER
ANTICOAGULATION  MANAGEMENT    Assessment     Yesterday's INR result of 2.7 is Therapeutic (goal INR of 2.0-3.0)        Warfarin taken as previously instructed    No new diet changes affecting INR    Potential interaction between Bactrim and warfarin which may affect subsequent INRs - started 12/9 for 10 days    Continues to tolerate warfarin with no reported s/s of bleeding or thromboembolism     Previous INR was Therapeutic    Plan:     Spoke on phone with Prerna regarding INR result and instructed:     Warfarin Dosing Instructions:   Took 5 mg Yesterday as instructed.  Advised to take 5 mg on Fri; 10 mg on Thur, Sat, and Sun (plan is 5 mg 3 days a week while taking bactrim)    Instructed patient to follow up no later than: Mon 12/14    Education provided: target INR goal and significance of current INR result, potential interaction between warfarin and bactrim, importance of notifying clinic for changes in medications, monitoring for bleeding signs and symptoms and when to seek medical attention/emergency care    Prerna verbalizes understanding and agrees to warfarin dosing plan.    Instructed to call the [facility] for any changes, questions or concerns. (#[phone])   ?   Sarah Goodwin RN    Subjective/Objective:      Prerna Pizarro, a 64 y.o. female is on warfarin.     Prerna reports:     Home warfarin dose: verbally confirmed home dose with Prerna and updated on anticoagulation calendar     Missed doses: No     Medication changes:  Yes: bactrim, 12/9 for 10 days     S/S of bleeding or thromboembolism:  No     New Injury or illness:  Yes: had a cyst lanced yesterday     Changes in diet or alcohol consumption:  No     Upcoming surgery, procedure or cardioversion:  No    Anticoagulation Episode Summary     Current INR goal:  2.0-3.0   TTR:  77.0 % (3.3 mo)   Next INR check:  12/14/2020   INR from last check:  2.70 (12/9/2020)   Weekly max warfarin dose:     Target end date:     INR check location:     Preferred  lab:     Send INR reminders to:  [facility]    Indications    Factor V Leiden (H) [D68.51]           Comments:           Anticoagulation Care Providers     Provider Role Specialty Phone number    Kenya Napoles MD Referring Family Medicine [phone]

## 2021-06-13 NOTE — TELEPHONE ENCOUNTER
The bandage and the packing should be changed every day. If she is having a lot of drainage/bleeding then I would recommend she go to the walk in clinic today; otherwise it would be fine for her friend to help with wound cares. I know she is set up next week for another clinic apt.    Dr Bañuelos

## 2021-06-13 NOTE — PROGRESS NOTES
Incision and Drainage Procedure Note  PRE-OP DIAGNOSIS: Infected sebaceous cyst  POST-OP DIAGNOSIS: Same   PROCEDURE: incision and drainage of abscess  Performing Physician: Kylie Hutson DO     Verbal consent obtained from patient and discussed with her risk of worsening infection, bleeding, pain.     PROCEDURE:   A timeout protocol was performed prior to initiating the procedure.  The area was prepared and draped in the usual, sterile manner. The site was anesthetized with 5cc 1% lidocaine without epinephrine. A 1cm linear incision along the local skin lines was made and the purulent material expressed. The abcess was explored thoroughly and sequestered pockets were opened. Bleeding was minimal.   Packing: packing was put in place. And wound covered with gauze and medical tape     Followup: The patient tolerated the procedure well without complications.  Standard post-procedure care is explained and return precautions are given. She will be seen tomorrow to change the packing

## 2021-06-13 NOTE — TELEPHONE ENCOUNTER
ANTICOAGULATION  MANAGEMENT    Assessment     Today's INR result of 2.2 is Therapeutic (goal INR of 2.0-3.0)        More warfarin taken than instructed which may be affecting INR    No new diet changes affecting INR    No new medication/supplements affecting INR    Continues to tolerate warfarin with no reported s/s of bleeding or thromboembolism     Previous INR was Supratherapeutic    Plan:     Spoke on phone with Prerna regarding INR result and instructed:     Warfarin Dosing Instructions:  Change warfarin dose to 5 mg daily on Thur; and 10 mg daily rest of week  (same amount pt took the past 7 days)    Instructed patient to follow up no later than: 2 weeks.     Education provided: importance of following up for INR monitoring at instructed interval and importance of taking warfarin as instructed    Prerna verbalizes understanding and agrees to warfarin dosing plan.    Instructed to call the [facility] for any changes, questions or concerns. (#[phone])   ?   Usman Covarrubias RN    Subjective/Objective:      Prerna Pizarro, a 64 y.o. female is on warfarin.     Prerna reports:     Home warfarin dose: template incorrect; verbally confirmed home dose with Prerna and updated on anticoagulation calendar     Missed doses: No     Medication changes:  No     S/S of bleeding or thromboembolism:  No     New Injury or illness:  No     Changes in diet or alcohol consumption:  No     Upcoming surgery, procedure or cardioversion:  No    Anticoagulation Episode Summary     Current INR goal:  2.0-3.0   TTR:  65.1 % (3.4 mo)   Next INR check:  12/9/2020   INR from last check:  2.20 (11/25/2020)   Weekly max warfarin dose:     Target end date:     INR check location:     Preferred lab:     Send INR reminders to:  YURI ALAN    Indications    Factor V Leiden (H) [D68.51]           Comments:           Anticoagulation Care Providers     Provider Role Specialty Phone number    Kenya Napoles MD Referring Family Medicine  [phone]

## 2021-06-14 NOTE — TELEPHONE ENCOUNTER
ANTICOAGULATION  MANAGEMENT PROGRAM    Prerna Pizarro is overdue for INR check.     Left message to call and schedule INR appointment as soon as possible.      Warren Mathews RN

## 2021-06-14 NOTE — TELEPHONE ENCOUNTER
ANTICOAGULATION  MANAGEMENT PROGRAM    Prerna Pizarro is overdue for INR check.     Left message to call and schedule INR appointment as soon as possible.      Sarah Goodwin, RN

## 2021-06-14 NOTE — TELEPHONE ENCOUNTER
ANTICOAGULATION  MANAGEMENT PROGRAM    Prerna Pizarro is overdue for INR check.     Spoke with Prerna and scheduled INR appointment on 1/12.      Sarah Goodwin RN

## 2021-06-14 NOTE — TELEPHONE ENCOUNTER
Refill Approved    Rx renewed per Medication Renewal Policy. Medication was last renewed on 7/29/20.    Kelly Ratliff, [facility] [place] Connection Triage/Med Refill 1/22/2021     Requested Prescriptions   Pending Prescriptions Disp Refills     buPROPion (WELLBUTRIN XL) 300 MG 24 hr tablet [Pharmacy Med Name: BUPROPION XL 300MG TABLETS] 90 tablet 1     Sig: TAKE 1 TABLET(300 MG) BY MOUTH DAILY       Tricyclics/Misc Antidepressant/Antianxiety Meds Refill Protocol Passed - 1/22/2021  5:42 AM        Passed - PCP or prescribing provider visit in last year     Last office visit with prescriber/PCP: Visit date not found OR same dept: 12/17/2020 Randolph Flores MD OR same specialty: 12/17/2020 Randolph Flores MD  Last physical: Visit date not found Last MTM visit: Visit date not found   Next visit within 3 mo: Visit date not found  Next physical within 3 mo: Visit date not found  Prescriber OR PCP: Tyson Padron MD  Last diagnosis associated with med order: 1. Moderate episode of recurrent major depressive disorder (H)  - buPROPion (WELLBUTRIN XL) 300 MG 24 hr tablet [Pharmacy Med Name: BUPROPION XL 300MG TABLETS]; TAKE 1 TABLET(300 MG) BY MOUTH DAILY  Dispense: 90 tablet; Refill: 1    If protocol passes may refill for 12 months if within 3 months of last provider visit (or a total of 15 months).

## 2021-06-15 PROBLEM — I82.409 DEEP VEIN THROMBOSIS (DVT) OF LOWER EXTREMITY (H): Status: ACTIVE | Noted: 2017-04-20

## 2021-06-15 NOTE — TELEPHONE ENCOUNTER
ANTICOAGULATION  MANAGEMENT    Assessment     Today's INR result of 2.2 is Therapeutic (goal INR of 2.0-3.0)        Warfarin recently held as instructed which may be affecting INR    No new diet changes affecting INR    No new medication/supplements affecting INR    Continues to tolerate warfarin with no reported s/s of bleeding or thromboembolism     Previous INR was Supratherapeutic    Plan:     Spoke on phone with Prerna regarding INR result and instructed:      Warfarin Dosing Instructions:  Continue current warfarin dose 5 mg daily on Mo/Th; and 10 mg daily rest of week  (0 % change)    Instructed patient to follow up no later than: 2 weeks-- will call back to schedule    Education provided: target INR goal and significance of current INR result, importance of notifying clinic for changes in medications and importance of notifying clinic for diarrhea, nausea/vomiting, reduced intake and/or illness    Prerna verbalizes understanding and agrees to warfarin dosing plan.    Instructed to call the [facility] for any changes, questions or concerns. (#[phone])   ?   Shaina Small RN    Subjective/Objective:      Prerna Pizarro, a 65 y.o. female is on warfarin.       Prerna reports:     Home warfarin dose: as updated on anticoagulation calendar per template     Missed doses: No     Medication changes:  No     S/S of bleeding or thromboembolism:  No     New Injury or illness:  No     Changes in diet or alcohol consumption:  No     Upcoming surgery, procedure or cardioversion:  No     Anticoagulation Episode Summary     Current INR goal:  2.0-3.0   TTR:  31.1 % (4.5 mo)   Next INR check:  3/26/2021   INR from last check:  2.20 (3/12/2021)   Weekly max warfarin dose:     Target end date:     INR check location:     Preferred lab:     Send INR reminders to:  YURI ALAN    Indications    Factor V Leiden (H) [D68.51]           Comments:           Anticoagulation Care Providers     Provider Role Specialty Phone  number    Kenya Napoles MD [facility] Medicine [phone]

## 2021-06-15 NOTE — TELEPHONE ENCOUNTER
Provider Review: Anticoagulation Annual Referral Renewal    [facility] Renewal Decision:  Renew [facility] warfarin management      INR Range:   Continue management at current INR goal   Anticipated Duration of Therapy (from today):  Long-term anticoagulation      Kenya Napoles MD  8:00 AM

## 2021-06-15 NOTE — TELEPHONE ENCOUNTER
[facility] renewal referral placed.   Standing INR ordered.    Shaina Small, RN  Anticoagulation Clinic

## 2021-06-15 NOTE — TELEPHONE ENCOUNTER
"Anticoagulation Annual Referral Renewal Review    Prerna Pizarro's chart reviewed for annual renewal of referral to anticoagulation monitoring.        Criteria for anticoagulation nurse and/or pharmacist renewal met   Warfarin indication: Hypercoagulable state: Factor V Leiden Yes, per indication   Current with INR monitoring/compliant Yes Yes   Date of last office visit 7/29/20 Yes, had office visit within last year   Time in Therapeutic Range (TTR) 36.6 % No, TTR < 60 %       Prerna Pizarro did NOT meet all criteria for anticoagulation management program initiated renewal and requires provider review. Using dot phrase, \".acmrenewalprovider\", please advise if Prerna's anticoagulation management referral should be renewed or if patient should be seen in office to review anticoagulation therapy      Shaina Small RN  1:14 PM      "

## 2021-06-15 NOTE — TELEPHONE ENCOUNTER
RN cannot approve Refill Request    RN can NOT refill this medication PCP messaged that patient is overdue for Labs. Last office visit: Visit date not found Last Physical: Visit date not found Last MTM visit: Visit date not found Last visit same specialty: 12/17/2020 Randolph Flores MD.  Next visit within 3 mo: Visit date not found  Next physical within 3 mo: Visit date not found      Suki Walker, [facility] Triage/Med Refill 2/8/2021    Requested Prescriptions   Pending Prescriptions Disp Refills     varenicline (CHANTIX) 1 mg tablet [Pharmacy Med Name: CHANTIX 1MG TABLETS] 180 tablet 1     Sig: TAKE 1 TABLET BY MOUTH TWICE DAILY, TAKE AFTER FOOD AND WITH A FULL GLASS OF WATER       Varenicline Refill Protocol Failed - 2/8/2021  1:23 PM        Failed - Normal Serum Creatinine in past 12 months      Creatinine   Date Value Ref Range Status   05/26/2019 0.75 0.60 - 1.10 mg/dL Final             Passed - PCP or prescribing provider visit in last 12 or next 3 months.     Last office visit with prescriber/PCP: Visit date not found OR same dept: 12/17/2020 Randolph Flores MD  Last physical: Visit date not found       Next visit within 3 mo: Visit date not found  Next physical within 3 mo: Visit date not found  Prescriber OR PCP: Tyson Padron MD  Last diagnosis associated with med order: 1. Tobacco abuse counseling  - CHANTIX 1 mg tablet [Pharmacy Med Name: CHANTIX 1MG TABLETS]; TAKE 1 TABLET BY MOUTH TWICE DAILY, TAKE AFTER FOOD AND WITH A FULL GLASS OF WATER  Dispense: 180 tablet; Refill: 1     Requested Prescriptions     Pending Prescriptions Disp Refills     varenicline (CHANTIX) 1 mg tablet [Pharmacy Med Name: CHANTIX 1MG TABLETS] 180 tablet 1     Sig: TAKE 1 TABLET BY MOUTH TWICE DAILY, TAKE AFTER FOOD AND WITH A FULL GLASS OF WATER     May refill for 3 months if protocol passes.

## 2021-06-15 NOTE — TELEPHONE ENCOUNTER
Spoke with patient regarding INR results.   Requested a message be sent to PCP regarding COVID vaccine.   States that she is 6 months short of being 66 years old, and given her co-morbidities and blood clotting risks, she is wondering if that allows her to get her vaccine sooner.     Requests a call back from 's team.     Shaina Small RN  Anticoagulation Clinic

## 2021-06-15 NOTE — TELEPHONE ENCOUNTER
"ANTICOAGULATION  MANAGEMENT PROGRAM    Prerna Pizarro is overdue for INR check.     Spoke with Prerna and scheduled INR appointment on 2/10. Patient stated, \"I'm sorry, I know its far out, but that's the soonest I can make it in\".      Kenya Osorio RN    "

## 2021-06-15 NOTE — TELEPHONE ENCOUNTER
ANTICOAGULATION  MANAGEMENT    Assessment     Today's INR result of 2.9 is Therapeutic (goal INR of 2.0-3.0)        Warfarin taken as previously instructed    No new diet changes affecting INR    No new medication/supplements affecting INR    Continues to tolerate warfarin with no reported s/s of bleeding or thromboembolism     Previous INR was Supratherapeutic    Plan:     Spoke on phone with Prerna regarding INR result and instructed:      Warfarin Dosing Instructions:  Continue current warfarin dose 5 mg daily on Thu; and 10 mg daily rest of week  (0 % change)    Instructed patient to follow up no later than: 2 weeks    Education provided: target INR goal and significance of current INR result, importance of notifying clinic for changes in medications and importance of notifying clinic for diarrhea, nausea/vomiting, reduced intake and/or illness    Prerna verbalizes understanding and agrees to warfarin dosing plan.    Instructed to call the [facility] for any changes, questions or concerns. (#[phone])   ?   Sarah Goodwin RN    Subjective/Objective:      Prerna Pizarro, a 65 y.o. female is on warfarin. Prerna Graff reports:     Home warfarin dose: verbally confirmed home dose with Prerna and updated on anticoagulation calendar     Missed doses: No     Medication changes:  No     S/S of bleeding or thromboembolism:  No     New Injury or illness:  No     Changes in diet or alcohol consumption:  No     Upcoming surgery, procedure or cardioversion:  No    Anticoagulation Episode Summary     Current INR goal:  2.0-3.0   TTR:  36.3 % (3.5 mo)   Next INR check:  2/24/2021   INR from last check:  2.90 (2/10/2021)   Weekly max warfarin dose:     Target end date:     INR check location:     Preferred lab:     [facility]    Indications    Factor V Leiden (H) [D68.51]           Comments:           Anticoagulation Care Providers     Provider Role Specialty Phone number    Kenya Napoles,  MD [facility] [phone]

## 2021-06-15 NOTE — TELEPHONE ENCOUNTER
ANTICOAGULATION  MANAGEMENT PROGRAM    Prerna Pizarro is overdue for INR check.     Spoke with Prerna and scheduled INR appointment on 3/4.      Shaina Small RN

## 2021-06-16 PROBLEM — Z71.6 TOBACCO ABUSE COUNSELING: Status: ACTIVE | Noted: 2018-09-20

## 2021-06-16 PROBLEM — F10.20 ALCOHOLISM (H): Status: ACTIVE | Noted: 2019-05-28

## 2021-06-16 PROBLEM — S52.532A CLOSED COLLES' FRACTURE OF LEFT RADIUS: Status: ACTIVE | Noted: 2019-05-23

## 2021-06-16 PROBLEM — M15.0 PRIMARY OSTEOARTHRITIS INVOLVING MULTIPLE JOINTS: Status: ACTIVE | Noted: 2018-02-21

## 2021-06-16 PROBLEM — E66.01 MORBID OBESITY (H): Status: ACTIVE | Noted: 2018-09-19

## 2021-06-16 NOTE — TELEPHONE ENCOUNTER
Telephone Encounter by Usman Covarrubias RN at 10/10/2019  8:58 AM     Author: Usman Covarrubias RN Service: -- Author Type: RN, Care Manager    Filed: 10/10/2019 10:09 AM Encounter Date: 10/10/2019 Status: Attested    : Usman Covarrubias RN (RN, Care Manager) Cosigner: Kenya Napoles MD at 10/10/2019 11:21 AM    Attestation signed by Kenya Napoles MD at 10/10/2019 11:21 AM    Kenya Napoles MD  [facility]                  ANTICOAGULATION  MANAGEMENT    Assessment     Today's INR result of 2.8 is Therapeutic (goal INR of 2.0-3.0)        Warfarin taken as previously instructed    No new diet changes affecting INR    No new medication/supplements affecting INR    Continues to tolerate warfarin with no reported s/s of bleeding or thromboembolism     Previous INR was Therapeutic    Plan:     Left a detailed message for Prerna regarding INR result and instructed:     Warfarin Dosing Instructions:  Continue current warfarin dose 10 mg daily.    Instructed patient to follow up no later than: 4 weeks.     Education provided: importance of taking warfarin as instructed      Instructed to call the [facility] for any changes, questions or concerns. (#[phone])   ?   Usman Covarrubias RN    Subjective/Objective:      Prerna Pizarro, a 63 y.o. female is on warfarin.     Prerna reports:     Home warfarin dose: as updated on anticoagulation calendar per template     Missed doses: No     Medication changes:  Yes: discontinued Celexa     S/S of bleeding or thromboembolism:  No     New Injury or illness:  No     Changes in diet or alcohol consumption:  No     Upcoming surgery, procedure or cardioversion:  No    Anticoagulation Episode Summary     Current INR goal:   2.0-3.0   TTR:   87.6 %   Next INR check:   11/7/2019   INR from last check:   2.80 (10/10/2019)   Weekly max warfarin dose:      Target end date:      INR check location:      Preferred lab:      Send INR  reminders to:   [facility]    Indications    Factor V Leiden (H) [D68.51]           Comments:            Anticoagulation Care Providers     Provider Role Specialty Phone number    Kenya Napoles MD Referring Family Medicine [phone]

## 2021-06-16 NOTE — TELEPHONE ENCOUNTER
Telephone Encounter by Usman Covarrubias RN at 12/6/2019  3:38 PM     Author: Usman Covarrubias RN Service: -- Author Type: RN, Care Manager    Filed: 12/6/2019  3:54 PM Encounter Date: 12/6/2019 Status: Attested    : Usman Covarrubias RN (RN, Care Manager) Cosigner: Kneya Napoles MD at 12/6/2019  4:08 PM    Attestation signed by Kenya Napoles MD at 12/6/2019  4:08 PM    Kenya Napoles MD  [facility]                  ANTICOAGULATION  MANAGEMENT    Assessment     Today's INR result of 1.3 is Subtherapeutic (goal INR of 2.0-3.0)        Warfarin recently held as instructed which may be affecting INR- just resumed on 12/4    No new diet changes affecting INR    No new medication/supplements affecting INR    Continues to tolerate warfarin with no reported s/s of bleeding or thromboembolism     Previous INR was Therapeutic     Pt said she has not been bridging with Lovenox post procedure because the Vascular clinic specifically instructed her to d/c Lovenox.    Plan:     Spoke with Prerna regarding INR result and instructed:     Warfarin Dosing Instructions:  Take booster dose of 15 mg today only then continue current warfarin dose 10 mg daily.    Instructed patient to follow up no later than: Tues 12/10. Appt made.     Education provided: importance of taking warfarin as instructed    Prerna verbalizes understanding and agrees to warfarin dosing plan.    Instructed to call the [facility] for any changes, questions or concerns. (#[phone])   ?   Usman Covarrubias RN    Subjective/Objective:      Prerna Pizarro, a 63 y.o. female is on warfarin.     Prerna reports:     Home warfarin dose: verbally confirmed home dose with pt and updated on anticoagulation calendar     Missed doses: No     Medication changes:  No     S/S of bleeding or thromboembolism:  No     New Injury or illness:  No     Changes in diet or alcohol consumption:  No     Upcoming surgery, procedure or  cardioversion:  No    Anticoagulation Episode Summary     Current INR goal:   2.0-3.0   TTR:   65.9 % (9.3 mo)   Next INR check:   12/10/2019   INR from last check:   1.30! (12/6/2019)   Weekly max warfarin dose:      Target end date:      INR check location:      Preferred lab:      Send INR reminders to:   [facility]    Indications    Factor V Leiden (H) [D68.51]           Comments:            Anticoagulation Care Providers     Provider Role Specialty Phone number    Kenya Napoles MD Referring Family Medicine [phone]

## 2021-06-16 NOTE — TELEPHONE ENCOUNTER
Telephone Encounter by Usman Covarrubias RN at 12/17/2019  1:40 PM     Author: Usman Covarrubias RN Service: -- Author Type: RN, Care Manager    Filed: 12/17/2019  2:06 PM Encounter Date: 12/17/2019 Status: Attested    : Usman Covarrubias RN (RN, Care Manager) Cosigner: Kenya Napoles MD at 12/17/2019  2:13 PM    Attestation signed by Kenya Napoles MD at 12/17/2019  2:13 PM    Kenya Napoles MD  [facility]                  ANTICOAGULATION  MANAGEMENT    Assessment     Today's INR result of 2.3 is Therapeutic (goal INR of 2.0-3.0)        Warfarin taken as previously instructed    No new diet changes affecting INR    No new medication/supplements affecting INR    Continues to tolerate warfarin with no reported s/s of bleeding or thromboembolism     Previous INR was Therapeutic    Plan:     Left a detailed message for Prerna regarding INR result and instructed:     Warfarin Dosing Instructions:  Continue current warfarin dose 10 mg daily.    Instructed patient to follow up no later than: 2 weeks.     Education provided: importance of taking warfarin as instructed      Instructed to call the [facility] for any changes, questions or concerns. (#[phone])   ?   Usman Covarrubias RN    Subjective/Objective:      Prerna Pizarro, a 63 y.o. female is on warfarin.     Prerna reports:     Home warfarin dose: as updated on anticoagulation calendar per template     Missed doses: No     Medication changes:  No     S/S of bleeding or thromboembolism:  No     New Injury or illness:  No     Changes in diet or alcohol consumption:  No     Upcoming surgery, procedure or cardioversion:  No    Anticoagulation Episode Summary     Current INR goal:   2.0-3.0   TTR:   66.6 % (9.7 mo)   Next INR check:   12/31/2019   INR from last check:   2.30 (12/17/2019)   Weekly max warfarin dose:      Target end date:      INR check location:      Preferred lab:      Send INR reminders to:    [facility]    Indications    Factor V Leiden (H) [D68.51]           Comments:            Anticoagulation Care Providers     Provider Role Specialty Phone number    Kenya Napoles MD Referring Family Medicine [phone]

## 2021-06-16 NOTE — PROGRESS NOTES
Assessment/Plan:        Diagnoses and all orders for this visit:    Cough    Other orders  -     azithromycin (ZITHROMAX Z-YESSY) 250 MG tablet; Take 2 tablets (500 mg) on  Day 1,  followed by 1 tablet (250 mg) once daily on Days 2 through 5.  Dispense: 6 tablet; Refill: 0  -     albuterol (PROAIR HFA;PROVENTIL HFA;VENTOLIN HFA) 90 mcg/actuation inhaler; Inhale 2 puffs every 4 (four) hours as needed for wheezing or shortness of breath (cough).  Dispense: 1 each; Refill: 0  -     benzonatate (TESSALON PERLES) 100 MG capsule; Take 1 capsule (100 mg total) by mouth 3 (three) times a day as needed for cough.  Dispense: 20 capsule; Refill: 0    DVT      We will hold off on imaging.  We will start her on Z-Yessy, albuterol inhaler, benzonatate.  Use the benzonatate if coughing fits worse.  Fluid hydration, precautionary measures.  Smoking cessation and target quit date March 8.  We will also do her INR.  She is aware of antibiotic and effects and INR.  She was agreeable with the plans.    INR came back at 2.6.  Continue with the 10 mg warfarin every day.  Recheck her INR in 2 weeks.  Subjective:    Patient ID: Prerna Pizarro is a 62 y.o. female.    HPI    Prerna is here with concerns about cough.  Symptoms started on February 14.  She was in the ER then and was complaining of chest pains and palpitations.  That has improved.  She had a chest x-ray done and was normal.  She started with a cold and now getting worse.  She has sore throat, head congestion, headache, runny nose.  Now more coughing.  Difficulty mobilizing phlegm.  Denies any hemoptysis.  No fever.  For the past 5-6 days, head congestion is worse and down to her chest.  Shortness of breath when she sleeps.  Has to sleep sitting up.  Has been having coughing fits.  Temperature of  for 2-3 days.  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  No recent travel.  Down to 1 cigarette per day and plans for March 8 as her target quit date.  She also notes  episodes of feeling cold, clammy at the end of the day, sweating at night.  Tried Mucinex, Tylenol Cold and flu, possible pro-air inhaler, Flonase.  Has been persistent.    Her friend passed away in January.  Her friend had brain cancer and was very ill.  She is trying to cope with it and so far has been doing okay.  She denies any thoughts of hurting herself or ending life.  Has been in communication with her friends family as well as her son.  On Wellbutrin as well as citalopram.    Review of Systems  As above otherwise negative.          Objective:    Physical Exam  /60 (Patient Site: Left Arm, Patient Position: Sitting, Cuff Size: Adult Large)  Pulse 63  Temp 98.8  F (37.1  C) (Oral)   Wt 204 lb 9.6 oz (92.8 kg)  SpO2 94%  Breastfeeding? No  BMI 35.12 kg/m2    Vital signs noted above. AAO ×3.  HEENT no nasal discharge, moist oral mucosa. Ears:TMs intact, no fluid collection. Neck: Supple neck, nonpalpable cervical lymph nodes.  Lungs: Symmetrical chest expansion, no retractions, rhonchi in her right mid to lower lungs.  Occasional wheezing, no rales.  Fair to good air entry.  Heart: S1-S2 regular rate and rhythm, no murmurs were noted.  Abdomen:  with bowel sounds.  Extremities: pulses were full and equal.

## 2021-06-16 NOTE — TELEPHONE ENCOUNTER
ANTICOAGULATION  MANAGEMENT PROGRAM    Prerna Pizarro is overdue for INR check.     Left message to call and schedule INR appointment as soon as possible.      Susan Estrada RN
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
## 2021-06-16 NOTE — TELEPHONE ENCOUNTER
Telephone Encounter by Usman Covarrubias RN at 2/28/2019  3:19 PM     Author: Usman Covarrubias RN Service: -- Author Type: RN, Care Manager    Filed: 2/28/2019  3:55 PM Encounter Date: 2/28/2019 Status: Attested    : Usman Covarrubias RN (RN, Care Manager) Cosigner: Kenya Napoles MD at 2/28/2019  4:14 PM    Attestation signed by Kenya Napoles MD at 2/28/2019  4:14 PM    Kenya Napoles MD  [facility]                  ANTICOAGULATION  MANAGEMENT    Assessment     Today's INR result of 2.9 is Therapeutic (goal INR of 2.0-3.0)        Warfarin taken as previously instructed    No new diet changes affecting INR    No new medication/supplements affecting INR    Continues to tolerate warfarin with no reported s/s of bleeding or thromboembolism     Previous INR was Therapeutic    Plan:     Left a detailed message for Prerna regarding INR result and instructed:     Warfarin Dosing Instructions:  Continue current warfarin dose 10 mg daily.    Instructed patient to follow up no later than: 4 weeks.    Education provided: importance of taking warfarin as instructed      Instructed to call the [facility] for any changes, questions or concerns. (#[phone])   ?   Usman Covarrubias RN    Subjective/Objective:      Prerna Pizarro, a 63 y.o. female is on warfarin.     Prerna reports:     Home warfarin dose: as updated on anticoagulation calendar per template     Missed doses: No     Medication changes:  Yes, stopped Trazodone.     S/S of bleeding or thromboembolism:  No     New Injury or illness:  No     Changes in diet or alcohol consumption:  No     Upcoming surgery, procedure or cardioversion:  No    Anticoagulation Episode Summary     Current INR goal:   2.0-3.0   TTR:   100.0 % (5.1 mo)   Next INR check:   3/28/2019   INR from last check:   2.90 (2/28/2019)   Weekly max warfarin dose:      Target end date:      INR check location:      Preferred lab:      Send INR  reminders to:   ANTICOAGULATION POOL B (MPW,HUG,STW,RVL,[facility],RLN)    Indications    Factor V Leiden (H) [D68.51]           Comments:            Anticoagulation Care Providers     Provider Role Specialty Phone number    Kenya Napoles MD Referring Family Medicine [phone]

## 2021-06-16 NOTE — TELEPHONE ENCOUNTER
Telephone Encounter by Angela Daniel, PharmD at 11/25/2019  3:04 PM     Author: Angela Daniel, PharmBREANNE Service: -- Author Type: Pharmacist    Filed: 11/25/2019  3:07 PM Encounter Date: 11/20/2019 Status: Signed    : Angela Daniel PharmD (Pharmacist)       OUMOU-PROCEDURAL ANTICOAGULATION MANAGEMENT    Returned call to Vascular clinic and  relayed pre-procedure orders:      Okay to hold warfarin 5 days prior to procedure    No bridge    Angela Daniel PharmD  [facility]      __________________  Louie Schultz MD  You 2 hours ago (12:37 PM)      Should be ok to not bridge. I typically would only rec bridging for valves and bleeding disorder  patients.     Thanks     Louie Bassett comment          _____________      Kenya Napoles MD  You 7 hours ago (7:38 AM)      Dear Angela - I'm honestly not sure. I'm ok going with the guidelines but will you also reach out to the surgeon? Thank you,   Keturah Napoles

## 2021-06-16 NOTE — TELEPHONE ENCOUNTER
Telephone Encounter by Usman Covarrubias RN at 6/14/2019  3:52 PM     Author: Usman Covarrubias RN Service: -- Author Type: RN, Care Manager    Filed: 6/14/2019  4:12 PM Encounter Date: 6/14/2019 Status: Attested    : Usman Covarrubias RN (RN, Care Manager) Cosigner: Kenya Napoles MD at 6/17/2019  7:39 AM    Attestation signed by Kenya Napoles MD at 6/17/2019  7:39 AM    Kenya Napoles MD  [facility]                  ANTICOAGULATION  MANAGEMENT    Assessment     Today's INR result of 2.9 is Therapeutic (goal INR of 2.0-3.0)        Warfarin taken as previously instructed    No new diet changes affecting INR    No new medication/supplements affecting INR    Continues to tolerate warfarin with no reported s/s of bleeding or thromboembolism     Previous INR was Therapeutic    Plan:     Left a detailed message for Prerna regarding INR result and instructed:     Warfarin Dosing Instructions:  Continue current warfarin dose 10 mg daily.    Instructed patient to follow up no later than: 2 weeks.     Education provided: importance of taking warfarin as instructed      Instructed to call the [facility] for any changes, questions or concerns. (#[phone])   ?   Usman Covarrubias RN    Subjective/Objective:      Prerna Pizarro, a 63 y.o. female is on warfarin.     Prerna reports:     Home warfarin dose: as updated on anticoagulation calendar per template     Missed doses: No     Medication changes:  No     S/S of bleeding or thromboembolism:  No     New Injury or illness:  No     Changes in diet or alcohol consumption:  No     Upcoming surgery, procedure or cardioversion:  No    Anticoagulation Episode Summary     Current INR goal:   2.0-3.0   TTR:   73.3 % (8.6 mo)   Next INR check:   6/28/2019   INR from last check:   2.90 (6/14/2019)   Weekly max warfarin dose:      Target end date:      INR check location:      Preferred lab:      Send INR reminders to:   [facility]    Indications    Factor V Leiden (H) [D68.51]           Comments:            Anticoagulation Care Providers     Provider Role Specialty Phone number    Kenya Napoles MD Referring Family Medicine [phone]

## 2021-06-16 NOTE — TELEPHONE ENCOUNTER
Telephone Encounter by Usman Covarrubias RN at 6/5/2019  4:08 PM     Author: Usman Covarrubias RN Service: -- Author Type: RN, Care Manager    Filed: 6/5/2019  4:18 PM Encounter Date: 6/5/2019 Status: Attested    : Usman Covarrubias RN (RN, Care Manager) Cosigner: Kenya Napoles MD at 6/6/2019  7:48 AM    Attestation signed by Kenya Napoles MD at 6/6/2019  7:48 AM    Kenya Napoles MD  [facility]                  ANTICOAGULATION  MANAGEMENT    Assessment     Today's INR result of 2.6 is Therapeutic (goal INR of 2.0-3.0)        Warfarin taken as previously instructed    No new diet changes affecting INR    No new medication/supplements affecting INR     Pt stopped Lovenox on 6/2.     Continues to tolerate warfarin with no reported s/s of bleeding or thromboembolism     Previous INR was Subtherapeutic    Plan:     Spoke with Prerna regarding INR result and instructed:     Warfarin Dosing Instructions:  Continue current warfarin dose 10 mg daily.    Instructed patient to follow up no later than: 10 days. Appt is made for 6/14.     Education provided: importance of taking warfarin as instructed    Prerna verbalizes understanding and agrees to warfarin dosing plan.    Instructed to call the [facility] for any changes, questions or concerns. (#[phone])   ?   Usman Covarrubias RN    Subjective/Objective:      Prerna Pizarro, a 63 y.o. female is on warfarin.     Prerna reports:     Home warfarin dose: verbally confirmed home dose with pt and updated on anticoagulation calendar     Missed doses: No     Medication changes:  No     S/S of bleeding or thromboembolism:  No     New Injury or illness:  No     Changes in diet or alcohol consumption:  No     Upcoming surgery, procedure or cardioversion:  No    Anticoagulation Episode Summary     Current INR goal:   2.0-3.0   TTR:   72.3 % (8.3 mo)   Next INR check:   6/14/2019   INR from last check:   2.60 (6/5/2019)   Weekly  max warfarin dose:      Target end date:      INR check location:      Preferred lab:      [facility]    Indications    Factor V Leiden (H) [D68.51]           Comments:            Anticoagulation Care Providers     Provider Role Specialty Phone number    Kenya Napoles MD Referring Family Medicine [phone]

## 2021-06-16 NOTE — TELEPHONE ENCOUNTER
Telephone Encounter by Usman Covarrubias RN at 12/10/2019  3:40 PM     Author: Usman Covarrubias RN Service: -- Author Type: RN, Care Manager    Filed: 12/10/2019  3:45 PM Encounter Date: 12/10/2019 Status: Attested    : Usman Covarrubias RN (RN, Care Manager) Cosigner: Kenya Napoles MD at 12/10/2019  3:55 PM    Attestation signed by Keyna Napoles MD at 12/10/2019  3:55 PM    Kenya Napoles MD  [facility]                  ANTICOAGULATION  MANAGEMENT    Assessment     Today's INR result of 2.9 is Therapeutic (goal INR of 2.0-3.0)        More warfarin taken than instructed which may be affecting INR    No new diet changes affecting INR    Potential interaction between Keflex and warfarin which may affect subsequent INRs- started yesterday x6.5 days for possible thigh area infection.     Continues to tolerate warfarin with no reported s/s of bleeding or thromboembolism     Previous INR was Subtherapeutic    Plan:     Spoke with Prerna regarding INR result and instructed:     Warfarin Dosing Instructions:  Take lower dose of 5 mg today only then continue current warfarin dose 10 mg daily.    (taking a reduced dose today because INR went from 1.3 to 2.9 in four days)    Instructed patient to follow up no later than: Fri 12/13. Appt made.     Education provided: potential interaction between warfarin and Keflex    Prerna verbalizes understanding and agrees to warfarin dosing plan.    Instructed to call the [facility] for any changes, questions or concerns. (#[phone])   ?   Usman Covarrubias RN    Subjective/Objective:      Prerna Pizarro, a 63 y.o. female is on warfarin.     Prerna reports:     Home warfarin dose: verbally confirmed home dose with pt and updated on anticoagulation calendar     Missed doses: No     Medication changes:  Yes: Keflex     S/S of bleeding or thromboembolism:  No     New Injury or illness:  Yes: possible thigh infection.     Changes in  diet or alcohol consumption:  No     Upcoming surgery, procedure or cardioversion:  No    Anticoagulation Episode Summary     Current INR goal:   2.0-3.0   TTR:   65.8 % (9.5 mo)   Next INR check:   12/13/2019   INR from last check:   2.90 (12/10/2019)   Weekly max warfarin dose:      Target end date:      INR check location:      Preferred lab:      Send INR reminders to:   [facility]    Indications    Factor V Leiden (H) [D68.51]           Comments:            Anticoagulation Care Providers     Provider Role Specialty Phone number    Kenya Napoles MD Referring Family Medicine [phone]

## 2021-06-16 NOTE — TELEPHONE ENCOUNTER
ANTICOAGULATION  MANAGEMENT PROGRAM    Prerna Pizarro is overdue for INR check.     Left message to call and schedule INR appointment as soon as possible.      Shaina Small RN

## 2021-06-16 NOTE — TELEPHONE ENCOUNTER
ANTICOAGULATION  MANAGEMENT    Assessment     Today's INR result of 2.7 is Therapeutic (goal INR of 2.0-3.0)        Warfarin taken as previously instructed    Increased greens/vitamin K intake may be affecting INR-- 4 large salads this past week, does plan to continue to have more salads but likely closer to 2-3 a week    No new medication/supplements affecting INR    Continues to tolerate warfarin with no reported s/s of bleeding or thromboembolism     Previous INR was Therapeutic    Plan:     Spoke on phone with Prerna regarding INR result and instructed:      Warfarin Dosing Instructions:  Continue current warfarin dose 5 mg daily on M/Th; and 10 mg daily rest of week  (0 % change)    Instructed patient to follow up no later than: 2 weeks as INR is therapeutic, however she has recently increased her greens significantly, so [facility] wants a closer eye on INR    Education provided: importance of consistent vitamin K intake, target INR goal and significance of current INR result, monitoring for clotting signs and symptoms and when to seek medical attention/emergency care    Prerna verbalizes understanding and agrees to warfarin dosing plan.    Instructed to call the [facility] for any changes, questions or concerns. (#[phone])   ?   Shaina Small RN    Subjective/Objective:      Prerna Pizarro, a 65 y.o. female is on warfarin. Prerna Graff reports:     Home warfarin dose: as updated on anticoagulation calendar per template     Missed doses: No     Medication changes:  No     S/S of bleeding or thromboembolism:  No     New Injury or illness:  No     Changes in diet or alcohol consumption:  Increased greens     Upcoming surgery, procedure or cardioversion:  No    Anticoagulation Episode Summary     Current INR goal:  2.0-3.0   TTR:  44.6 % (5.6 mo)   Next INR check:  3/26/2021   INR from last check:  2.70 (4/14/2021)   Weekly max warfarin dose:     Target end date:     INR check location:     Preferred lab:      Send INR reminders to:  [facility]    Indications    Factor V Leiden (H) [D68.51]           Comments:           Anticoagulation Care Providers     Provider Role Specialty Phone number    Kenya Napoles MD Referring Family Medicine [phone]

## 2021-06-16 NOTE — TELEPHONE ENCOUNTER
Telephone Encounter by Usman Covarrubias RN at 8/15/2019 10:26 AM     Author: Usman Covarrubias RN Service: -- Author Type: RN, Care Manager    Filed: 8/15/2019 10:29 AM Encounter Date: 8/15/2019 Status: Attested    : Usman Covarrubias RN (RN, Care Manager) Cosigner: Kenya Napoles MD at 8/15/2019 11:26 AM    Attestation signed by Kenya Napoles MD at 8/15/2019 11:26 AM    Kenya Napoles MD  [facility]                  ANTICOAGULATION  MANAGEMENT    Assessment     Today's INR result of 2.5 is Therapeutic (goal INR of 2.0-3.0)        Warfarin taken as previously instructed    No new diet changes affecting INR    No new medication/supplements affecting INR    Continues to tolerate warfarin with no reported s/s of bleeding or thromboembolism     Previous INR was Therapeutic    Plan:     Left a detailed message for Prerna regarding INR result and instructed:     Warfarin Dosing Instructions:  Continue current warfarin dose 10 mg daily.    Instructed patient to follow up no later than: 4 weeks.     Education provided: importance of taking warfarin as instructed      Instructed to call the [facility] for any changes, questions or concerns. (#[phone])   ?   Usman Covarrubias RN    Subjective/Objective:      Prerna Pizarro, a 63 y.o. female is on warfarin.     Prerna reports:     Home warfarin dose: as updated on anticoagulation calendar per template     Missed doses: No     Medication changes:  No     S/S of bleeding or thromboembolism:  No     New Injury or illness:  No     Changes in diet or alcohol consumption:  No     Upcoming surgery, procedure or cardioversion:  No    Anticoagulation Episode Summary     Current INR goal:   2.0-3.0   TTR:   78.4 % (10.7 mo)   Next INR check:   9/12/2019   INR from last check:   2.50 (8/15/2019)   Weekly max warfarin dose:      Target end date:      INR check location:      Preferred lab:      Send INR reminders to:   [facility]    Indications    Factor V Leiden (H) [D68.51]           Comments:            Anticoagulation Care Providers     Provider Role Specialty Phone number    Kenya Napoles MD Referring Family Medicine [phone]

## 2021-06-17 NOTE — TELEPHONE ENCOUNTER
Reason for Call:  Medication or medication refill:    Do you use a [facility]?  Name [facility] the [facility] [address] [place]- on [facility]    Name of the medication requested: warfarin 10 MG tablet  Trazodone 50 MG tablet    Other request: pt/rickey is staying on top of her INR's, she is scheduled for an INR at the [facility] tomorrow (04.30.2021)    Can we leave a detailed message on this number? Yes    Phone number patient can be reached at:   Cell number on file:    Telephone Information:   Mobile [phone]       Best Time: anytime    Call taken on 4/29/2021 at 10:01 AM by Marisela Castillo

## 2021-06-17 NOTE — PATIENT INSTRUCTIONS - HE
"Patient Instructions by Rae Robbins LPN at 12/17/2019 11:00 AM     Author: Rae Robbins LPN Service: -- Author Type: Licensed Nurse    Filed: 12/17/2019 11:15 AM Encounter Date: 12/17/2019 Status: Signed    : Rae Robbins LPN (Licensed Nurse)       Resume normal activity with exception of no flight for one more week.  Use compression socks as much as possible when on feet, long car rides and on air planes.  Return to clinic in 4 months.  Call if any questions [phone]       We are prescribing some compression stockings for you. I have included different suppliers that should help you get measured and fitting to ensure proper fitting socks. You should wear this socks as much as you can. It is especially important to wear them with long periods of sitting/standing, long car rides or if you will be flying. Compression socks should get refilled every 4-6 months. They do not need to be worn at night while in bed.    If you do a lot of standing it is good to do calf raises to help keep the blood pumping. If you sit a lot at work it is good to get up periodically to walk around. Elevation of the foot of your bed 4-6\" helps the blood return back to where it is needed.      Varicose Veins      Varicose veins are swollen, enlarged veins most often found in the legs. They are usually blue or purple in color and may bulge, twist, and stand out under the skin.  Normally, veins return blood from the body to the heart. The leg veins have one-way valves that prevent blood from flowing backward in the vein. When the valves are weak or damaged, blood backs up in the veins. This may cause some of the veins to swell and bulge and become varicose veins.  Symptoms  Varicose veins may or may not cause symptoms. If symptoms do occur, they can include:    Legs that feel tired, achy, heavy, or itchy    Leg muscle cramps    Skin changes, such as discoloration, dryness, redness, or rash (in more severe cases, " you may also have sores on the skin called venous leg ulcers)  Risk Factors  There are a number of factors that increase the risk for varicose veins. These can include:    Being a woman    Being older    Sitting or standing for long periods    Being overweight    Being pregnant    Having a family history of varicose veins  Treatment begins with simple self-help measures (see below). If these dont help, there are many procedures that can be done to shrink or remove varicose veins. Your healthcare provider can tell you more about these options, if needed.  Home care    Support or compression stockings will likely be prescribed. If so, be sure to wear them as directed. They may help improve blood flow.    Exercising helps strengthen your leg muscles and improve blood flow. To get the most benefit, choose exercises such as walking, swimming, or cycling. Also try to exercise for at least 30 minutes on most days.    Raising (elevating) your legs lets gravity help blood flow back to the heart. Sit or lie with your feet above heart level a few times throughout the day, or as directed.    Avoid long periods of sitting or standing. Change positions often. Also, move your ankles, toes and knees often. This may also help improve blood flow.    If you are overweight, talk with your healthcare provider about setting up a weight-loss plan. Maintaining a healthy weight can help reduce the strain on your veins. It may also improve symptoms, such as swelling and aching.    If you have dryness and itching, ask your provider about special lotions that can be applied to the skin to help improve symptoms.  Follow-up care  Follow up with your healthcare provider, or as directed. If imaging tests were done, youll be told the results and if there are any new findings that affect your care.  When to seek medical advice  Call your healthcare provider right away if any of these occur:    Sudden, severe leg swelling, pain, or  redness    Symptoms worsen, or they dont improve with self-care    Bleeding from any affected veins    Ulcers form on the legs, ankles, or feet    Fever of 100.4 F (38 C) or higher, or as advised by your provider      Understanding Spider and Varicose Veins  Do you often hide your legs because of the way they look? You may have noticed tiny red or blue bursts (spider veins). Or maybe you have veins that bulge or look twisted (varicose veins). If so, there are treatments that can help  What are the symptoms?  Spider veins or varicose veins may never be a problem. But sometimes they can cause legs to ache or swell. Your legs may also feel heavy and tired, or like theyre burning. These symptoms may be more severe at the end of the day. Prolonged sitting or standing can also make your symptoms worse.  Who gets spider and varicose veins?  Anyone can get spider or varicose veins. But vein problems tend to be hereditary (run in families). Other factors that can affect veins include:    Pregnancy, hormones, and birth control pills    A job where you stand or sit a lot    Extra weight or lack of exercise    Age         Spider veins look like tiny webs on the ankles, legs, and upper thighs.       Ropy, dark blue, red, or flesh-colored varicose veins are most common on the thighs, calves, and feet.    What can be done?  Spider and varicose veins can affect the way you feel about yourself. Talk to your healthcare provider about your concerns. There are treatments that can ease symptoms and make your legs look better.  Your treatment choices  Treatment may include self-care, sclerotherapy (injecting veins with a chemical), surgery, or newer nonsurgical minimally invasive therapies. Spider veins and some varicose veins can be treated with sclerotherapy. Large varicose veins can often be treated with newer minimally invasive procedures and, in rare cases, surgery may be needed.     Please call [facility] to  schedule an appointment. If you received a prescription please bring it with you to your appointment. You may call one of the locations below, although some locations are limited to what they carry.    Office Locations  New Locations  [facility]  Home Medical Equipment  [address], [place] [address]  [facility] ([facility])  [address], [place] [address]  Phone [phone] /Fax [phone]        [facility]   [address]   Medical [address]/Orthotics and Prosthetics [address]  [place] [address]   Phone: [phone]  Fax [phone]    [facility]  [address]  [place] [address]  Phone: [phone]  Fax: [phone]    [facility]   [address]  [place] [address]  Phone: [phone]  Fax: [phone]    [facility]  [address]  [place] [address]  Phone: [phone]  Fax: [phone]    [facility]  [address]  [address]  [place] [address]  Phone: [phone]  Fax: [phone]    [facility]  [address]  [address]   [place] [address]   Phone: [phone]  Fax: [phone]    [place]   [address] [place] [address]   Phone: [phone]  Fax: [phone]    [facility]  [address]  [place] [address]    [place] [phone]  [phone]  [phone]  [place] [phone]  [email or web address]      [facility]   [address]             [address]                 [address]     [place] [address]      [place], [place] [id number] [place] [address] [phone]                        [phone]  [phone]     [phone]               Fax: [phone]  [email or web address]                                                     [facility]  [address]  [place], [place] [address] [phone]  [phone]  [email or web address]    Kareem Jade  [phone]  [email or web address]    [facility]   [phone]    [place] [address] [place] [address] [phone]

## 2021-06-17 NOTE — PATIENT INSTRUCTIONS - HE
Patient Instructions by Ruma Cervantes MD at 5/17/2019  2:00 PM     Author: Ruma Cervantes MD Service: -- Author Type: Physician    Filed: 5/17/2019  2:45 PM Encounter Date: 5/17/2019 Status: Addendum    : Ruma Cervantes MD (Physician)    Related Notes: Original Note by Ruma Cervantes MD (Physician) filed at 5/17/2019  2:38 PM       Recommend frequent icing and elevation, tylenol for pain.  Follow up check on Monday if not improved.  Recommend ED over weekend if bluish discoloration develops      Patient Education     Bruises (Contusions)    A contusion is a bruise. A bruise happens when a blow to your body doesn't break the skin but does break blood vessels beneath the skin. Blood leaking from the broken vessels causes redness and swelling. As it heals, your bruise is likely to turn colors like purple, green, and yellow. This is normal. The bruise should fade in 2 or 3 weeks.  Factors that make you more likely to bruise  Almost everyone bruises now and then. Certain people do bruise more easily than others. You're more prone to bruising as you get older. That's because blood vessels become more fragile with age. You're also more likely to bruise if you have a clotting disorder such as hemophilia or take medicines that reduce clotting, including aspirin and coumadin.  When to go to the emergency room (ER)  Bruises almost always heal on their own without special treatment. But for some people, a bad bruise can be serious. Seek medical care if you:    Have a clotting disorder such as hemophilia    Have cirrhosis or other serious liver disease    Take blood-thinning medicines such as warfarin  What to expect in the ER  A doctor will examine your bruise and ask about any health conditions you have. In some cases, you may have a test to check how well your blood clots. Other treatment will depend on your needs.  Follow-up care  Sometimes a bruise gets worse instead of better. It may become larger and more swollen.  This can occur when your body walls off a small pool of blood under the skin (hematoma). In very rare cases, your doctor may need to drain extra blood from the area.  Tip:  Apply an ice pack or bag of frozen peas to a bruise. Keep a thin cloth between the ice or frozen peas and your skin. The cold can help reduce redness and swelling.   Date Last Reviewed: 12/1/2016 2000-2017 [employer]. [address] [place] [address], [place] [address]. All rights reserved. This information is not intended as a substitute for professional medical care. Always follow your healthcare professional's instructions.

## 2021-06-17 NOTE — TELEPHONE ENCOUNTER
Telephone Encounter by Usman Covarrubias RN at 11/12/2020  3:04 PM     Author: Usman Covarrubias RN Service: -- Author Type: RN, Care Manager    Filed: 11/12/2020  3:08 PM Encounter Date: 11/12/2020 Status: Signed    : Usman Covarrubias RN (RN, Care Manager)       ANTICOAGULATION  MANAGEMENT    Assessment     Today's INR result of 4.0 is Supratherapeutic (goal INR of 2.0-3.0)        Warfarin taken as previously instructed    No new diet changes affecting INR    No new medication/supplements affecting INR    Continues to tolerate warfarin with no reported s/s of bleeding or thromboembolism     Previous INR was Supratherapeutic    Plan:     Spoke on phone with Prerna regarding INR result and instructed:     Warfarin Dosing Instructions:  Hold today only then change warfarin dose to 5 mg daily on Mon, Thur; and 10 mg daily rest of week  (14 % change)    Instructed patient to follow up no later than: 10 days.     Education provided: importance of following up for INR monitoring at instructed interval and importance of taking warfarin as instructed    Prerna verbalizes understanding and agrees to warfarin dosing plan.    Instructed to call the [facility] for any changes, questions or concerns. (#[phone])   ?   Usman Covarrubias RN    Subjective/Objective:      Prerna Pizarro, a 64 y.o. female is on warfarin.     Prerna reports:     Home warfarin dose: verbally confirmed home dose with pt and updated on anticoagulation calendar     Missed doses: No     Medication changes:  No     S/S of bleeding or thromboembolism:  No     New Injury or illness:  No     Changes in diet or alcohol consumption:  No     Upcoming surgery, procedure or cardioversion:  No    Anticoagulation Episode Summary     Current INR goal:  2.0-3.0   TTR:  59.4 % (3.4 mo)   Next INR check:  11/20/2020   INR from last check:  4.00 (11/12/2020)   Weekly max warfarin dose:     Target end date:     INR check location:     Preferred lab:      Send INR reminders to:  [facility]    Indications    Factor V Leiden (H) [D68.51]           Comments:           Anticoagulation Care Providers     Provider Role Specialty Phone number    Kenya Napoles MD Referring Family Medicine [phone]

## 2021-06-17 NOTE — TELEPHONE ENCOUNTER
Telephone Encounter by Sarah Goodwin RN at 1/13/2021  2:59 PM     Author: Sarah Goodwin RN Service: -- Author Type: Registered Nurse    Filed: 1/13/2021  3:04 PM Encounter Date: 1/13/2021 Status: Signed    : Sarah Goodwin RN (Registered Nurse)       ANTICOAGULATION  MANAGEMENT    Assessment     Today's INR result of 3.9 is Supratherapeutic (goal INR of 2.0-3.0)        More warfarin taken than instructed which may be affecting INR    No new diet changes affecting INR    No new medication/supplements affecting INR    Continues to tolerate warfarin with no reported s/s of bleeding or thromboembolism     Previous INR was Therapeutic    Plan:     Spoke on phone with Prerna regarding INR result and instructed:     Warfarin Dosing Instructions:  Hold warfarin today only then continue current warfarin dose 5 mg daily on Thu; and 10 mg daily rest of week  (0 % change)    Instructed patient to follow up no later than: 2 weeks    Education provided: target INR goal and significance of current INR result, importance of taking warfarin as instructed, importance of notifying clinic for changes in medications and importance of notifying clinic for diarrhea, nausea/vomiting, reduced intake and/or illness    Prerna verbalizes understanding and agrees to warfarin dosing plan.    Instructed to call the [facility] for any changes, questions or concerns. (#[phone])   ?   Sarah Goodwin RN    Subjective/Objective:      Prerna Pizarro, a 65 y.o. female is on warfarin.     Prerna reports:     Home warfarin dose: verbally confirmed home dose with Prerna and updated on anticoagulation calendar     Missed doses: No     Medication changes:  No     S/S of bleeding or thromboembolism:  No     New Injury or illness:  No     Changes in diet or alcohol consumption:  No     Upcoming surgery, procedure or cardioversion:  No    Anticoagulation Episode Summary     Current INR goal:  2.0-3.0   TTR:  58.3 % (3.4 mo)   Next INR check:   1/27/2021   INR from last check:  3.90 (1/13/2021)   Weekly max warfarin dose:     Target end date:     INR check location:     Preferred lab:     Send INR reminders to:  [facility]    Indications    Factor V Leiden (H) [D68.51]           Comments:           Anticoagulation Care Providers     Provider Role Specialty Phone number    Kenya Napoles MD Referring Family Medicine [phone]

## 2021-06-17 NOTE — TELEPHONE ENCOUNTER
Telephone Encounter by Usman Covarrubias RN at 10/28/2020 10:04 AM     Author: Usman Covarrubias RN Service: -- Author Type: RN, Care Manager    Filed: 10/28/2020 10:05 AM Encounter Date: 10/27/2020 Status: Signed    : Usman Covarrubias RN (RN, Care Manager)       ANTICOAGULATION  MANAGEMENT    Assessment     10/27's INR result of 4.2 is Supratherapeutic (goal INR of 2.0-3.0)        Warfarin taken as previously instructed    Decreased greens/vitamin K intake may be affecting INR- will resume normal greens.    No new medication/supplements affecting INR    Continues to tolerate warfarin with no reported s/s of bleeding or thromboembolism     Previous INR was Therapeutic    Plan:     Spoke on phone with Prerna regarding INR result and instructed:     Warfarin Dosing Instructions:  Hold 10/27 then continue current warfarin dose 10 mg daily.    Instructed patient to follow up no later than: 10 days.    Education provided: importance of consistent vitamin K intake, importance of following up for INR monitoring at instructed interval and importance of taking warfarin as instructed    Prerna verbalizes understanding and agrees to warfarin dosing plan.    Instructed to call the [facility] for any changes, questions or concerns. (#[phone])   ?   Usman Covarrubias RN    Subjective/Objective:      Prerna Pizarro, a 64 y.o. female is on warfarin.     Prerna reports:     Home warfarin dose: verbally confirmed home dose with pt and updated on anticoagulation calendar     Missed doses: No     Medication changes:  No     S/S of bleeding or thromboembolism:  No     New Injury or illness:  No     Changes in diet or alcohol consumption:  Yes: ate fewer salads     Upcoming surgery, procedure or cardioversion:  No    Anticoagulation Episode Summary     Current INR goal:  2.0-3.0   TTR:  71.7 % (3.3 mo)   Next INR check:  11/9/2020   INR from last check:  4.20 (10/27/2020)   Weekly max warfarin dose:     Target end  date:     INR check location:     Preferred lab:     Send INR reminders to:  [facility]    Indications    Factor V Leiden (H) [D68.51]           Comments:           Anticoagulation Care Providers     Provider Role Specialty Phone number    Kenya Napoles MD Referring Family Medicine [phone]

## 2021-06-17 NOTE — TELEPHONE ENCOUNTER
Telephone Encounter by Susan Estrada RN at 10/20/2020  3:56 PM     Author: Susan Estrada RN Service: -- Author Type: Registered Nurse    Filed: 10/20/2020  3:59 PM Encounter Date: 10/20/2020 Status: Attested    : Susan Estrada RN (Registered Nurse) Cosigner: Kenya Napoles MD at 10/20/2020  4:13 PM    Attestation signed by Kenya Napoles MD at 10/20/2020  4:13 PM    Kenya Napoles MD  Family Medicine [facility]                  ANTICOAGULATION  MANAGEMENT PROGRAM    Prerna is being followed by anticoagulation program while in noncompliant status per Kenya Napoles MD request.  Prerna is receiving every 6 week reminder calls.     Last INR checked on 2/6/20    Called and left message for Prerna to remind her that she is overdue for INR check. If returning call, please schedule INR as soon as possible.    Susan Estrada RN

## 2021-06-17 NOTE — TELEPHONE ENCOUNTER
Telephone Encounter by Shaina Small RN at 3/5/2021  3:10 PM     Author: Shaina Small RN Service: -- Author Type: Registered Nurse    Filed: 3/5/2021  3:51 PM Encounter Date: 3/5/2021 Status: Signed    : Shaina Small RN (Registered Nurse)       ANTICOAGULATION  MANAGEMENT    Assessment     Today's INR result of 4.5 is Supratherapeutic (goal INR of 2.0-3.0)        Warfarin taken as previously instructed    No new diet changes affecting INR    No new medication/supplements affecting INR    Continues to tolerate warfarin with no reported s/s of bleeding or thromboembolism     Previous INR was Therapeutic    Plan:     Spoke on phone with Prerna regarding INR result and instructed:      Warfarin Dosing Instructions:  hold warfarin tonight then change warfarin dose to 5 mg daily on Mo/Th; and 10 mg daily rest of week  (7.7 % change)    Instructed patient to follow up no later than: 7-10 days-- scheduled for 3/12    Education provided: target INR goal and significance of current INR result, importance of notifying clinic for changes in medications, monitoring for bleeding signs and symptoms, when to seek medical attention/emergency care and importance of notifying clinic for diarrhea, nausea/vomiting, reduced intake and/or illness    Reviewed s/s of bleeding to be aware of, such as nose bleeds, bleeding gums, increased/unusual bruising, dark stools, red/orange urine, prolonged headaches, unusual fatigue, and vomiting/coughing up blood.     Prerna verbalizes understanding and agrees to warfarin dosing plan.    Instructed to call the [facility] for any changes, questions or concerns. (#[phone])   ?   Shaina Small RN    Subjective/Objective:      Prerna Pizarro, a 65 y.o. female is on warfarin. Prerna Graff reports:     Home warfarin dose: as updated on anticoagulation calendar per template     Missed doses: No     Medication changes:  No     S/S of bleeding or thromboembolism:  No      New Injury or illness:  No     Changes in diet or alcohol consumption:  No     Upcoming surgery, procedure or cardioversion:  No    Anticoagulation Episode Summary     Current INR goal:  2.0-3.0   TTR:  30.9 % (4.3 mo)   Next INR check:  3/12/2021   INR from last check:  4.50 (3/5/2021)   Weekly max warfarin dose:     Target end date:     INR check location:     Preferred lab:     [facility]    Indications    Factor V Leiden (H) [D68.51]           Comments:           Anticoagulation Care Providers     Provider Role Specialty Phone number    Kenya Napoles MD Referring Family Medicine [phone]

## 2021-06-17 NOTE — TELEPHONE ENCOUNTER
Telephone Encounter by Susan Estrada RN at 9/3/2020  4:28 PM     Author: Susan Estrada RN Service: -- Author Type: Registered Nurse    Filed: 9/3/2020  4:31 PM Encounter Date: 3/12/2020 Status: Attested    : Susan Estrada RN (Registered Nurse) Cosigner: Kenya Napoles MD at 9/4/2020 11:17 AM    Attestation signed by Kenya Napoles MD at 9/4/2020 11:17 AM    Kenya Napoles MD  Family Medicine [facility]                  ANTICOAGULATION MANAGEMENT PROGRAM    Dr. Napoles,     Our records indicate that Prerna remains past due for an INR check. Prerna was contacted multiple times over at least the last 8 weeks to attempt to arrange a follow up appointment.    Prerna last had an INR checked on 2/6/20 which was 2.8 and was due for follow up on 3/5/20     Called patient who was reluctantly agreeable to schedule an INR appointment on 9/9.     At this time Prerna will be moved to noncompliant status within the program. While in noncompliant status the patient would continue to be contacted every 6 weeks by the anticoagulation program to attempt to schedule an INR. You will be notified and asked to co-sign each contact attempt to make you aware of patient's ongoing noncompliance.       Thank you,     [facility]ticoagulation Management Program  [phone]

## 2021-06-17 NOTE — TELEPHONE ENCOUNTER
ANTICOAGULATION  MANAGEMENT    Assessment     Today's INR result of 2.4 is Therapeutic (goal INR of 2.0-3.0)        Warfarin taken as previously instructed    No new diet changes affecting INR    No new medication/supplements affecting INR    Continues to tolerate warfarin with no reported s/s of bleeding or thromboembolism     Previous INR was Therapeutic    Plan:     Spoke on phone with Prerna regarding INR result and instructed:      Warfarin Dosing Instructions:  Continue current warfarin dose 5 mg daily on M/Th; and 10 mg daily rest of week  (0 % change)    Instructed patient to follow up no later than: 4 weeks    Education provided: target INR goal and significance of current INR result, importance of notifying clinic for changes in medications and importance of notifying clinic for diarrhea, nausea/vomiting, reduced intake and/or illness    Prerna verbalizes understanding and agrees to warfarin dosing plan.    Instructed to call the [facility] for any changes, questions or concerns. (#[phone])   ?   Shaina Small RN    Subjective/Objective:      Prerna Pizarro, a 65 y.o. female is on warfarin.       Prerna reports:     Home warfarin dose: as updated on anticoagulation calendar per template     Missed doses: No     Medication changes:  No     S/S of bleeding or thromboembolism:  No     New Injury or illness:  No     Changes in diet or alcohol consumption:  No     Upcoming surgery, procedure or cardioversion:  No    Anticoagulation Episode Summary     Current INR goal:  2.0-3.0   TTR:  49.4 % (6.1 mo)   Next INR check:  5/28/2021   INR from last check:  2.40 (4/30/2021)   Weekly max warfarin dose:     Target end date:     INR check location:     Preferred lab:     Send INR reminders to:  YURI ALAN    Indications    Factor V Leiden (H) [D68.51]           Comments:           Anticoagulation Care Providers     Provider Role Specialty Phone number    Kenya Napoles MD Referring Family Medicine  [phone]

## 2021-06-18 NOTE — LETTER
Letter by Usman Covarrubias RN at      Author: Usman Covarrubias RN Service: -- Author Type: --    Filed:  Encounter Date: 12/19/2018 Status: (Other)       Prerna Pizarro  [address] [place] [address] [address] [address]      January 4, 2019      Dear Ms. Pizarro,    You are currently under the care of [facility] Management Program for your warfarin (Coumadin ) therapy. Our records show that your last INR was on 11/14/2018 and you were due to come back on 12/12/2018. We have attempted to reach you by phone to schedule an INR appointment, but have not heard back from you.    The correct dose of warfarin for you may change from time to time based on your INR result. We would like to ensure that your warfarin dose is correct and that you are not having any side effects. It is not safe for you to be on Warfarin if your INR is not checked regularly. If your INR is too high, serious bleeding can occur. If your INR is too low, blood clots can form and lead to heart attack, stroke or a blood clot in the lung.      Getting your INR checked as instructed is required in order for the Health system Anticoagulation Management Program to continue managing and refilling your warfarin. We realize that it might be difficult for you to have frequent blood testing, but it is for your own safety.    Please contact us at [phone] as soon as possible to schedule an INR appointment. Our clinic staff is available Monday through Friday 8:00 am to 5:00 pm.  If you have been told to stop taking warfarin or your warfarin is being managed by another healthcare provider, please call and inform our staff.       Sincerely,     [facility] Antico[facility] Management [facility]

## 2021-06-18 NOTE — LETTER
Letter by Usman Covarrubias RN at      Author: Usman Covarrubias RN Service: -- Author Type: --    Filed:  Encounter Date: 12/19/2018 Status: (Other)       Prerna Pizarro  [address] [place] [address] [address] [address]      January 18, 2019      Dear Ms. Pizarro,    You are currently under the care of [facility] Anticoagulation Management Program for your warfarin therapy.  We have attempted to contact you several times regarding your warfarin therapy but have not received a return call from you.  Our records show that your last INR was on 11/14/18 and you were due to come back on: 12/12/2018.     The correct dose of warfarin for you may change from time to time based on your INR result. We would like to ensure that your warfarin dose is correct and that you are not having any side effects. It is not safe for you to be on Warfarin if your INR is not checked regularly. If your INR is too high, serious bleeding can occur. If your INR is too low, blood clots can form and lead to heart attack, stroke or a blood clot in the lung.  Getting your INR checked as instructed is required in order for the [facility] Anticoagulation Management Program to continue managing and refilling your warfarin.    If you have been told to stop taking warfarin or your warfarin is being managed by another healthcare provider, please call and inform our staff so we can update your records.  If this is not the case, please contact us at [phone] to schedule an INR appointment as soon as possible. Our clinic staff is available Monday through Friday 8:00 am to 5:00 pm.     If we do not hear from you, it may lead to being discharged from the Anticoagulation Management Program.  If this occurs, your [facility] primary care provider would then take over managing your warfarin and you would need to have regular office visits with your provider for warfarin management.    Sincerely,       [facility] Anticoagulation Management Program

## 2021-06-18 NOTE — LETTER
Letter by Kristyn Bañuelos MD at      Author: Kristyn Bañuelos MD Service: -- Author Type: --    Filed:  Encounter Date: 3/12/2019 Status: (Other)       Prerna Pizarro  [address] [place] [address] [address] [place] [address] [address]       March 12, 2019     Dear Ms. Pizarro,    Below are the results from your recent visit:    Resulted Orders   Gynecologic Cytology (PAP Smear)   Result Value Ref Range    Case Report       Gynecologic Cytology Report                       Case: [id number]                                   Authorizing Provider:  Kenya Napoles MD   Collected:           02/28/2019 1407              Ordering Location:     [facility] Received:            02/28/2019 1407              First Screen:          Anjana Barahona CT                                                                            (ASCP)                                                                       Rescreen:              Tess Arellano CT                                                                           (ASCP)                                                                       Specimen:    SUREPATH PAP, SCREENING, Endocervical/cervical                                             Interpretation  Negative for squamous intraepithelial lesion or malignancy.      Negative for squamous intraepithelial lesion or malignancy    Result Flag Normal Normal   Lipid Cascade RANDOM   Result Value Ref Range    Cholesterol 182 <=199 mg/dL    Triglycerides 67 <=149 mg/dL    HDL Cholesterol 80 >=50 mg/dL    LDL Calculated 89 <=129 mg/dL    Patient Fasting > 8hrs? No    Comprehensive Metabolic Panel   Result Value Ref Range    Sodium 139 136 - 145 mmol/L    Potassium 4.3 3.5 - 5.0 mmol/L    Chloride 104 98 - 107 mmol/L    CO2 27 22 - 31 mmol/L    Anion Gap, Calculation 8 5 - 18 mmol/L    Glucose 82 70 - 125 mg/dL    BUN 12 8 - 22 mg/dL    Creatinine 0.80 0.60 - 1.10 mg/dL    GFR MDRD Af Amer >60 >60 mL/min/1.73m2    GFR  MDRD Non Af Amer >60 >60 mL/min/1.73m2    Bilirubin, Total 0.3 0.0 - 1.0 mg/dL    Calcium 9.2 8.5 - 10.5 mg/dL    Protein, Total 6.6 6.0 - 8.0 g/dL    Albumin 3.8 3.5 - 5.0 g/dL    Alkaline Phosphatase 94 45 - 120 U/L    AST 29 0 - 40 U/L    ALT 31 0 - 45 U/L   Hepatitis C Antibody (Anti-HCV)   Result Value Ref Range    Hepatitis C Ab Negative Negative   Wet Prep, Vaginal   Result Value Ref Range    Yeast Result No yeast seen No yeast seen    Trichomonas No Trichomonas seen No Trichomonas seen    Clue Cells, Wet Prep No Clue cells seen No Clue cells seen   HPV High Risk DNA Cervical   Result Value Ref Range    HPV Source SurePath     HPV16 DNA Negative NEG    HPV18 DNA Negative NEG    Other HR HPV Positive (!) NEG    Final Diagnosis SEE NOTES       Comment:      This patient's sample is positive for other HR HPV DNA (types 31, 33, 35, 39, 45,  51, 52, 56, 58, 59, 66 or 68), not HPV 16 or HPV 18 DNA. This result requires  clinical correlation with concurrent cytology findings.         Your Pap smear came back normal, no evidence of cancerous or precancerous cells.  However you did test positive for a type of HPV virus, this puts you at increased risk of developing an abnormal Pap smear.  We should recheck your Pap smear with HPV testing in 1 year.    Your wet prep/vaginal swab came back negative -no evidence of vaginal infection.    You tested negative for hepatitis C.  Your electrolytes, kidney function and liver function are all normal.  Your cholesterol levels are within the normal range, no medication needed.    Please call with questions or contact us using [employer].    Sincerely,         Kristyn Bañuelos MD   (covering for Dr Napoles)

## 2021-06-19 NOTE — LETTER
Letter by Kenya Napoles MD at      Author: Kenya Napoles MD Service: -- Author Type: --    Filed:  Encounter Date: 3/20/2019 Status: (Other)        [facility]  [address]  [place] [place] [address]  [phone]         Prerna Pizarro  [address]  [place] [address]        03/20/19    Dear Prerna Pizarro,     At [facility] we care about your health and well-being. Your primary care provider is committed to ensuring you receive high quality care and has chosen a network of specialists to assist in providing that care. Recently Dr. Kenya Napoles referred you to [facility] for specialty care. They have made several attempts to connect with you to assist with scheduling, however they have been unable to reach you by phone.       It is important to your overall health to follow through with the recommendation from your provider. Please call [facility] ([phone]) at your earliest convenience for assistance in scheduling an appointment.  If you have already scheduled this appointment, please disregard this notice.  Thank you for choosing [facility] for your healthcare needs.       Sincerely,     Dr. Kenya Napoles /   [facility] Specialty [facility]

## 2021-06-19 NOTE — LETTER
Letter by Usman Covarrubias RN at      Author: Usman Covarrubias RN Service: -- Author Type: --    Filed:  Encounter Date: 7/5/2019 Status: (Other)         Prerna Pizarro  [address] [place] [address] [address] [place] [address]      July 26, 2019      Dear Ms. Pizarro,    You are currently under the care of [facility] Anticoagulation Management Program for your warfarin (Coumadin ) therapy. Our records show that your last INR was on 6/14/2019 and you were due to come back on 6/28/2019. We have attempted to reach you by phone to schedule an INR appointment, but have not heard back from you.    The correct dose of warfarin for you may change from time to time based on your INR result. We would like to ensure that your warfarin dose is correct and that you are not having any side effects. It is not safe for you to be on Warfarin if your INR is not checked regularly. If your INR is too high, serious bleeding can occur. If your INR is too low, blood clots can form and lead to heart attack, stroke or a blood clot in the lung.      Getting your INR checked as instructed is required in order for the [facility] Anticoagulation Management Program to continue managing and refilling your warfarin. We realize that it might be difficult for you to have frequent blood testing, but it is for your own safety.    Please contact us at [phone] as soon as possible to schedule an INR appointment. Our clinic staff is available Monday through Friday 8:00 am to 5:00 pm.  If you have been told to stop taking warfarin or your warfarin is being managed by another healthcare provider, please call and inform our staff.       Sincerely,     [facility] Anticoagulation Management Program

## 2021-06-19 NOTE — LETTER
Letter by Kenya Napoles MD at      Author: Kenya Napoles MD Service: -- Author Type: --    Filed:  Encounter Date: 3/18/2019 Status: (Other)        [facility]  [address]  [place] [address]  [phone]         Prerna Pizarro  [address]  [place] [address]        03/18/19    Dear Prerna Pizarro,     At [facility] we care about your health and well-being. Your primary care provider is committed to ensuring you receive high quality care and has chosen a network of specialists to assist in providing that care. Recently Dr Kenya Napoles [facility]Dr Schultz for specialty care.      It is important to your overall health to follow through with the recommendation from your provider. Please call [facility] ([phone]) at your earliest convenience for assistance in scheduling an appointment. If you have already scheduled this appointment, please disregard this notice. Thank you for choosing [facility] for your healthcare needs.       Sincerely,       [facility] Specialty Scheduling   Dr Kenya Napoles

## 2021-06-19 NOTE — LETTER
Letter by Kristyn Bañuelos MD at      Author: Kristyn Bañuelos MD Service: -- Author Type: --    Filed:  Encounter Date: 3/22/2019 Status: (Other)         Prerna Pizarro  [address] [place] [address] [address] [place] [address]       March 22, 2019     Dear Ms. Pizarro,    Below are the results from your recent visit:    Resulted Orders   CT Low Dose Lung Screening Chest    Narrative    EXAM: LOW DOSE LUNG CANCER SCREENING CT CHEST  LOCATION: [facility]  DATE/TIME: 3/22/2019 1:44 PM    INDICATION: Lung cancer screening. High risk patient with greater than 30 pack year smoking history.  COMPARISON: 08/17/2017.    TECHNIQUE: Low-dose lung cancer screening noncontrast CT chest. Dose reduction techniques were used.     FINDINGS:  LUNGS AND PLEURA: Mild bronchial wall thickening with a few scattered foci of mucous plugging. Lungs are otherwise clear.    MEDIASTINUM: No lymphadenopathy.    CORONARY ARTERY CALCIFICATION: Mild.    LIMITED UPPER ABDOMEN: Postop gastrointestinal bypass and cholecystectomy.    MUSCULOSKELETAL: Negative.      Impression    CONCLUSION:  1.  No pulmonary nodules.    RADIOLOGIST RECOMMENDATION: Recommend annual low-dose lung cancer screening CT if clinically appropriate.    LungRADS CATEGORY: 1: Negative.    SIGNIFICANT INCIDENTAL FINDINGS: Negative.              Your lung scan was normal - no evidence of cancer, masses or nodules. Because of your smoking history you should get this scan done once per year.    Please call with questions or contact us using [employer].    Sincerely,        Kristyn Bañuelos MD

## 2021-06-20 NOTE — LETTER
Letter by Usman Covarrubias RN at      Author: Usman Covarrubias RN Service: -- Author Type: --    Filed:  Encounter Date: 1/7/2020 Status: (Other)         Prerna Pizarro  [address]  [place] [facility] [address]      January 31, 2020      Dear MsGregorio Juarez,    You are currently under the care of [facility] Anticoagulation Management Program for your warfarin (Coumadin ) therapy.  We are contacting you because our records show you were due for an INR on 12/31/2019.    There are potentially serious risks when taking warfarin without careful monitoring and we want to make sure you are safely managed.  Routine INR monitoring is required for warfarin refills.     Please call [phone] as soon as possible to schedule an appointment.  If there has been a change in your care or other concerns, please let us know so we can help and or update our records.     Sincerely,       [facility] Anticoagulation Management Program                Notified Nay Feliciano

## 2021-06-20 NOTE — LETTER
Letter by Usman Covarrubias RN at      Author: Usman Covarrubias RN Service: -- Author Type: --    Filed:  Encounter Date: 3/12/2020 Status: (Other)         Prerna Pizarro  [address]  [place] [address]      May 11, 2020      Dear MsGregorio Juarez,    You are currently under the care of [facility] Anticoagulation Management Program for your warfarin (Coumadin ) therapy.  We are contacting you because our records show you were due for an INR on 3/5/2020.    There are potentially serious risks when taking warfarin without careful monitoring and we want to make sure you are safely managed.  Routine INR monitoring is required for warfarin refills.     Please call [phone] as soon as possible to schedule an appointment.  If there has been a change in your care or other concerns, please let us know so we can help and or update our records.     Sincerely,       [facility] Anticoagulation Management Program

## 2021-06-20 NOTE — LETTER
Letter by Anjana Espinoza RN at      Author: Anjana Espinoza RN Service: -- Author Type: --    Filed:  Encounter Date: 7/9/2020 Status: (Other)         Prerna Pizarro  [address]  [place] [address]      July 9, 2020    Dear Sen,    Its time for your yearly exam to check for lung cancer.   Please call your primary care doctor to schedule a brief visit so your Low Dose CT scan can be ordered at the time of that appointment. Your scan needs to be done within 30 days of your office visit.      You should have a yearly exam if:  Youre age 55 to 80 (55 to 77, if youre on Medicare)  Youve smoked a pack a day for at least 30 years (or 2 packs a day for at least 15 years)  If you no longer smoke, its been less than 15 years since you quit     These exams work best when done every year. They are good at finding lung cancer early, but they cant find all lung cancers. If you develop any symptoms (shortness of breath, chest pain, coughing up blood), call your doctor right away.     If you would like help to quit smoking, please talk with your doctor during your next visit. Or, call [employer] at [phone].        Sincerely,  [facility] ([facility]) Lung Cancer Screening Program

## 2021-06-20 NOTE — LETTER
Letter by Anjana Espinoza RN at      Author: Anjana Espinoza RN Service: -- Author Type: --    Filed:  Encounter Date: 8/6/2020 Status: (Other)         Prerna CARCAMO Juarez  [address]  [place] [address]      08/06/20        Dear Prerna,    Your annual lung cancer screening scan is overdue. We have attempted to reach you without success. If you remain interested please call your primary care provider to schedule an office visit so they can do the screening.  We will make no further attempts to call you to schedule at this point if we do not hear from you and assume you are no longer interested in the screening.     Sincerely,  [facility] ([facility]) Lung Cancer Screening Program

## 2021-06-20 NOTE — PROGRESS NOTES
Assessment/Plan:     Presents to establish care.    1. Factor V Leiden (H), history of DVT and saphenous vein embolism  - INR today  - Ambulatory referral to Anticoagulation Monitoring  -No changes to warfarin management except to hold the night before procedure.  This is a low risk procedure and patient is a high risk patient given her factor V Leiden.    2.  Moderate episode of recurrent major depressive disorder (H)  Appears to be well controlled, I suspect all of the symptoms manifested during our exam are normal grief reaction.  No changes to medication.    3. Grief reaction  Strongly recommended counseling.  Recommended grief specific counseling.  Patient is amenable, resources given to patient.    4. Tobacco abuse counseling  Discussed with patient that I would be happy to help in any way to help with smoking cessation.  However, I do not believe the Chantix is appropriate given patient's acute grief reaction.  Would try the nicotine patches first.    5.  Upcoming dental work  -Recommend the patient hold the trazodone the night before procedure as well.  She is given a pain medication and an anxiolytic, this should be sufficient to help her sleep.  Recommended that when patient is on narcotics and anxiolytics that she should not also be on trazodone.  Patient is amenable to the plan.  Pain will be managed by the dentist.    Discontinued Medications:  Medications Discontinued During This Encounter   Medication Reason     albuterol (PROAIR HFA;PROVENTIL HFA;VENTOLIN HFA) 90 mcg/actuation inhaler      benzonatate (TESSALON PERLES) 100 MG capsule      traZODone (DESYREL) 50 MG tablet      warfarin (COUMADIN) 10 MG tablet      warfarin (COUMADIN) 10 MG tablet        AVS printed and given to patient.  Return to clinic in 4-6 weeks.    I have had an Advance Directives discussion with the patient.  The following are part of a depression follow up plan for the patient:  mental health care assessment and mental  "health care management  I have counseled the patient for tobacco cessation and the follow up will occur  at the next visit.    Total time spent with patient was 45 minutes with greater than 50% spent in face-to-face counseling regarding the above plan.    This note has been dictated using voice recognition software. Any grammatical or context distortions are unintentional and inherent to the the software.     Kenya Napoles MD  Family Medicine [facility]      Subjective:      Prerna Pizarro is a 62 y.o. female who presents to clinic for establishment of care and several concerns.    1. Warfarin management:  -Patient has a dental procedure scheduled where she will have 13 tooth extractions.  She is carries about upcoming management.    -Patient has concerns about pain management after the procedure.  -Patient wonders about managing her warfarin before the procedure.  - Patient is taking trazodone and wonders if she should stop that before the appointment.    2. Depression: Patient has a history of substance abuse and depression, but currently depression is well controlled.  However, patient is tearful and describes losing 5 family members, most within 1 month.  She is struggling with grief and struggling.  No thoughts of hurting herself or others.  - TAYA 7: 5  - PHQ 9: 6    3. Smoking cessation: She is also very interested in stopping smoking.  She wanted to use this upcoming dental procedure as motivating factor to stop smoking.  She is interested in Chantix.      Past Medical History, Family History, and Social History reviewed.   History   Smoking Status     Current Every Day Smoker     Packs/day: 0.75   Smokeless Tobacco     Never Used       Review of systems is as stated in HPI.  Patient endorses: fatigue, allergy sx, swelling in legs, leg cramps, diarrhea, arthritis, headaches, easy bruising  The remainder of the 10 system review is otherwise negative.    Objective:     /78  Pulse 67  Ht 5' 2\" " (1.575 m)  Wt 203 lb (92.1 kg)  SpO2 95%  BMI 37.13 kg/m2 Body mass index is 37.13 kg/(m^2).    Gen: Alert, NAD, appears stated age, normal hygiene   MSK: grossly full range of motion in all joints, no obvious deformity  Neuro: CN II-XII grossly intact, no deficits in coordination  Psych: no apparent hallucinations or delusions, no pressured speech; alert, oriented x3, tearful but appropriate  SKIN: dry and without lesions; some varicosities appreciated in patient's lower extremities  Heme/lymph: no pallor, no active bleeding/bruising, no adenopathy appreciated      Current Outpatient Prescriptions on File Prior to Visit   Medication Sig Dispense Refill     albuterol (PROAIR HFA;PROVENTIL HFA;VENTOLIN HFA) 90 mcg/actuation inhaler Inhale 2 puffs every 4 (four) hours as needed for wheezing or shortness of breath (cough). 1 each 0     buPROPion (WELLBUTRIN XL) 300 MG 24 hr tablet TAKE 1 TABLET BY MOUTH EVERY DAY 90 tablet 2     citalopram (CELEXA) 40 MG tablet TAKE 1 TABLET(40 MG) BY MOUTH DAILY 90 tablet 2     multivitamin with minerals (THERA-M) 9 mg iron-400 mcg Tab tablet Take 1 tablet by mouth daily.       NAPROXEN SODIUM (ALEVE ORAL) Take 220 mg by mouth daily as needed.        traZODone (DESYREL) 50 MG tablet Take 1 tablet (50 mg total) by mouth at bedtime. 90 tablet 1     warfarin (COUMADIN) 10 MG tablet Take 10 mg by mouth at bedtime. Taking 10mg every night       No current facility-administered medications on file prior to visit.

## 2021-06-20 NOTE — LETTER
Letter by Usman Covarrubias RN at      Author: Usman Covarrubias RN Service: -- Author Type: --    Filed:  Encounter Date: 3/12/2020 Status: (Other)         Prerna Pizarro  [address]  [facility] [address]      April 16, 2020      Dear MsGregorio Juarez,    You are currently under the care of [facility] for your warfarin (Coumadin ) therapy.  We are contacting you because our records show you were due for an INR on 3/5/20.    There are potentially serious risks when taking warfarin without careful monitoring and we want to make sure you are safely managed.  Routine INR monitoring is required for warfarin refills.     Please call [phone] as soon as possible to schedule an appointment.  If there has been a change in your care or other concerns, please let us know so we can help and or update our records.     Sincerely,       [facility]

## 2021-06-23 NOTE — TELEPHONE ENCOUNTER
ANTICOAGULATION MANAGEMENT PROGRAM--Non-Compliance Chart Review    Prerna Pizarro is overdue for INR follow up.      INR Results:   Lab Results   Component Value Date    INR 2.70 (H) 11/14/2018    INR 2.20 (H) 10/18/2018    INR 2.60 (H) 09/19/2018         Chart reviewed, continue non-compliance protocol       Susan Estrada RN

## 2021-06-23 NOTE — TELEPHONE ENCOUNTER
Reason contacted:  INR  Information relayed:  Scheduled for a lab only appointment on Tuesday at 10:15 am.   Additional questions:  No  Further follow-up needed:  No  Okay to leave a detailed message:  No

## 2021-06-23 NOTE — TELEPHONE ENCOUNTER
Prerna Pizarro is overdue for INR follow up.      Prerna was due to come back on: 12/12/2018    Anticoagulation Management Program (ACM) has attempted to reach Prerna by phone and mail to schedule an INR appointment but Prerna has not responded.     ACN and/or pharmacist chart review requested.

## 2021-06-23 NOTE — TELEPHONE ENCOUNTER
ANTICOAGULATION MANAGEMENT PROGRAM--FINAL REMINDER NOTIFICATION     Dr. Napoles,    Your patient, Prerna Pizarro, has been under the care of the [facility] for warfarin management. Our records indicate that Prerna is either past due for an INR check or has not followed clinic staff recommendations. The patient s last INR was 2.7 on 11/14/2018.      Prerna received two phone calls and one letter over the last several weeks in attempt to arrange a follow up appointment.  Prerna is being sent a final reminder letter today to attempt to arrange a follow up appointment.      The [facility]tico[facility] is unable to manage anticoagulation therapy for non-compliant patients. If Prerna does not schedule follow up within 3 weeks from the date of her letter it may result in transition of Prerna from our service back to you for warfarin management.    Please don t hesitate to contact the Anticoagulation Management Program at [phone] if you have any questions or concerns.     Sincerely,     [facility]

## 2021-06-24 NOTE — PROGRESS NOTES
Assessment/Plan:     Patient presents today for routine physical examination.    Healthcare Maintenance: USPSTF recommendations for age 63;  Patient has been counseled on/screened for:  - intimate partner violence and there are no concerns at this time  - a healthful diet and physical activity for CVD disease prevention  - Diabetes and hyperlipidemia: screening was performed.   - Hep C, ordered today  - Asprin use: patient chose no to start and is on warfarin  - history of smoking, patient is eligible for the low dose chest CT today  Sexually transmitted infections: Patient would not like to be screened for chlamydia, gonorrhea, syphilis, HIV, and hepatitis  Colorectal Cancer: Colonoscopy last performed 2012, due in 2022  Immunizations: up to date except for shingles and influenza which was recommended  Cervical Cancer Screening: patient has been screened for cervical cancer per protocol years ago, results were unsure per patient report; screening today  Breast Cancer: Last mammogram per our records in 2012, ordered today    Lung Cancer Screening pre-scan counseling Visit    The patient fits the risk profile of patients who benefit from this screening:  -The patient is >55 years old and <80 years old  -The patient has 35 pack year history (over 30)  -The patient has smoked within the past 15 years  -The patient has no medical comorbidity severe enough that it would cause mortality prior to mortality due to the lung cancer attempting to be detected.    Discussion with patient regarding the harms associated with LDCT screening include false-negative and false-positive results, incidental findings, overdiagnosis, and radiation exposure were reviewed at length.   The patient understands that pursuing this screening test may result in a biopsy that was not necessary. It may also produce added stress over a nodule that is likely not cancer.    Of 100 patients who get screening, 25 will have a positive scan. Of those 25,  only 1 will have cancer.  Overdiagnosis is estimated at 10% of patients-- they would not have been detected in the patient's lifetime without screening. Less than 1% of patients likely had death related to radiation exposure increase.   Average low-dose CT associated with 0.61 to 1.5 mSv. Annual background radiation exposure in the [place] averages 2.4 mSv; mammogram is 0.7mSv.    The benefits are reduction in risk of death from lung cancer. The number needed to treat is 320 (for every 320 patients who undergo screening, 1 patient will have a benefit in mortality from early detection from the screening).    Undergoing this screening implies willingness to pursue further potentially invasive testing to discover potential cancer.    All questions were answered.    The patient was counseled regarding smoking cessation and its risk for lung cancer.      Additional concerns are as detailed below:    Offered a referral to the dermatologist for her various skin lesions, patient refer to watchfully wait.  They did not have concerning findings for melanoma, I feel it is reasonable to monitor.  Also offered referral to physical therapy for the sore shoulder but did not perform an extensive physical exam today, patient will return if it worsens.  No evidence of any cerumen impaction, or significant fluid deep to the tympanic membrane to account for patient's intermittent diminished hearing.  Offered an audiology test but patient declined.  She will return if it worsens.  She will continue to use Flonase as needed.    1. Insomnia  Refilled:  - traZODone (DESYREL) 50 MG tablet; Take 1 tablet (50 mg total) by mouth at bedtime.  Dispense: 90 tablet; Refill: 3    2. Moderate episode of recurrent major depressive disorder (H)  Refilled:  - buPROPion (WELLBUTRIN XL) 300 MG 24 hr tablet; Take 1 tablet (300 mg total) by mouth daily.  Dispense: 90 tablet; Refill: 3    3. Tobacco abuse counseling  Patient is already on Wellbutrin,  she is still desirous of quitting smoking, her mood is much better than last appointment  - varenicline (CHANTIX STARTING MONTH BOX) 0.5 mg (11)- 1 mg (42) tablet; 1 wk before you stop smoking take 0.5mg daily on days 1-3, 0.5mg 2 times each day on days 4-7, then 1mg 2 times daily.  Dispense: 53 tablet; Refill: 0  - varenicline (CHANTIX) 1 mg tablet; Take 1 tab by mouth two times a day. Take after eating with a full glass of water. NOTE: Dispense as maintenance for refills only.  Dispense: 60 tablet; Refill: 2  - CT Low Dose Lung Screening Chest; Future    4. Varicose veins of right lower extremity with pain  - Ambulatory referral to Vascular Center    5. Vaginal discharge  - wet prep negative      AVS printed and given to patient.  Return to clinic in 1 year.    Total time spent with patient was 45 minutes with greater than 50% spent in face-to-face counseling regarding the above plan.    This note has been dictated using voice recognition software. Any grammatical or context distortions are unintentional and inherent to the the software.     Kenya Napoles MD  Family Medicine [facility]    Subjective:     Prerna Pizarro is a 63 y.o. female who presents to clinic for routine physical.    Additional concerns include:    1. Problems with hearing, worse on the left than the right, occasionally feels as though it will 'release' spontaneously. She never has drainage. She is feeling symptomatic today. She has tried ear drops and flonase in the past.   2. Concerning skin lesions in the inguinal area, would like examined.  3. She also has a very sore right shoulder. Duration is several months, maybe 10 months. It makes it hard for her to figure out how to sleep. She also has an itch on that side which she is unsure if it is related.    Diet: getting better  Physical Activity: The patient engages in little, if any physical activity.    PHQ-9 Score:  5    Patient Care Team:   PCP: Kenya Napoles     Past Medical  "History, Family History, and Social History reviewed.     Review of systems is as stated in HPI.  Patient endorses: blurry vision, hearing changes, wheezing, shortness of breath, swelling in legs, leg cramps, diarrhea, joint pain and arthritis, nail changes and easy bleeding  The remainder of the 10 system review is otherwise negative.    Objective:     /88   Pulse 66   Ht 5' 2\" (1.575 m)   Wt 202 lb (91.6 kg)   SpO2 96%   BMI 36.95 kg/m    Gen: Alert, NAD, appears stated age, normal hygiene   Eyes: conjunctivae without injection, sclera clear, EOMI  ENT/mouth: nares clear, septum midline, absent rhinorrhea,absent pharyngeal injection, neck is supple, no thyroid enlargement; bilateral tympanic membranes unremarkable  CV: RRR, no murmur appreciated, pedal edema absent bilaterally  Resp: CTAB, no wheezes, rales or ronchi  ABD: normoactive, non-tender to palpation, nondistended  MSK: grossly full range of motion in all joints, no obvious deformity  Neuro: CN II-XII grossly intact, no deficits in coordination  Psych: no apparent hallucinations or delusions, no pressured speech; alert, oriented x3  SKIN: Very dry with evidence of excoriation along the back, several hyperpigmented scattered 1 cm lesions appreciated predominantly in patient's inguinal area without changes in pigmentation within the lesion, fairly normal borders; some varicosities appreciated  Heme/lymph: no pallor, no active bleeding/bruising, no adenopathy appreciated  :  - external genitalia: normal appearance, no lesions, no flattening of the anatomic structures  - urethral meatus: without prolapse, no obvious irritation  - vagina: expected resilience given estrogen status, no cystocele or rectocele, normal discharge but with an odor; some irritation of the vaginal canal  - cervix: normal, no lesions, no discharge  - uterus: anteverted  - anus and perineum: normal and without lesions  Breast:  - absent nipple discharge, no masses " appreciated, nontender to palpation      Medications:  Current Outpatient Medications   Medication Sig     albuterol (PROAIR HFA;PROVENTIL HFA;VENTOLIN HFA) 90 mcg/actuation inhaler Inhale 2 puffs every 4 (four) hours as needed for wheezing or shortness of breath (cough).     buPROPion (WELLBUTRIN XL) 300 MG 24 hr tablet Take 1 tablet (300 mg total) by mouth daily.     multivitamin with minerals (THERA-M) 9 mg iron-400 mcg Tab tablet Take 1 tablet by mouth daily.     NAPROXEN SODIUM (ALEVE ORAL) Take 220 mg by mouth daily as needed.      warfarin (COUMADIN) 10 MG tablet Take 10 mg by mouth at bedtime. Taking 10mg every night     traZODone (DESYREL) 50 MG tablet Take 1 tablet (50 mg total) by mouth at bedtime.     varenicline (CHANTIX STARTING MONTH BOX) 0.5 mg (11)- 1 mg (42) tablet 1 wk before you stop smoking take 0.5mg daily on days 1-3, 0.5mg 2 times each day on days 4-7, then 1mg 2 times daily.     varenicline (CHANTIX) 1 mg tablet Take 1 tab by mouth two times a day. Take after eating with a full glass of water. NOTE: Dispense as maintenance for refills only.       Allergies:  Allergies   Allergen Reactions     Contrast [Iohexol] Anaphylaxis     Diatrizoate Meglumine Anaphylaxis and Dizziness     Iodine Anaphylaxis     contrast     Tetracyclines Unknown     Over 30 years ago, no throat swelling       PMH:  Past Medical History:   Diagnosis Date     Acute bronchitis      Anxiety      Black tarry stools      Depression      ETOH abuse      Factor V Leiden (H)      Ovarian mass      Seasonal allergies        PSH:  Past Surgical History:   Procedure Laterality Date     MO APPENDECTOMY      Description: Appendectomy;  Recorded: 2011;     MO  DELIVERY ONLY      Description:  Section;  Recorded: 2011;     MO DILATION/CURETTAGE,DIAGNOSTIC      Description: Dilation And Curettage;  Recorded: 2011;     MO GASTRIC BYPASS,OBESE<150CM DIONICIO-EN-Y      Description: Gastric Surgery For Morbid  Obesity Bypass With Nii-en-Y;  Recorded: 08/24/2011;     GA INCISE SPINAL CORD TRACT(S)      Description: Laminectomy With Myelotomy Cervical;  Recorded: 08/24/2011;     TRANSURETHRAL RESECTION OF BLADDER TUMOR         Family Hx:  Family History   Problem Relation Age of Onset     Brain cancer Father      Cancer Sister         ovarian     Esophageal cancer Mother      Alcohol abuse Mother      Cancer Mother         throat     Alcohol abuse Brother      No Medical Problems Brother      No Medical Problems Brother      No Medical Problems Brother        Social History:  Social History     Socioeconomic History     Marital status: Single     Spouse name: Not on file     Number of children: Not on file     Years of education: Not on file     Highest education level: Not on file   Occupational History     Not on file   Social Needs     Financial resource strain: Not on file     Food insecurity:     Worry: Not on file     Inability: Not on file     Transportation needs:     Medical: Not on file     Non-medical: Not on file   Tobacco Use     Smoking status: Current Every Day Smoker     Packs/day: 0.75     Smokeless tobacco: Never Used   Substance and Sexual Activity     Alcohol use: No     Comment: Recovering alcoholic. Last drink was 1/29/13     Drug use: No     Sexual activity: Not Currently     Partners: Male     Birth control/protection: Post-menopausal   Lifestyle     Physical activity:     Days per week: Not on file     Minutes per session: Not on file     Stress: Not on file   Relationships     Social connections:     Talks on phone: Not on file     Gets together: Not on file     Attends Sikh service: Not on file     Active member of club or organization: Not on file     Attends meetings of clubs or organizations: Not on file     Relationship status: Not on file     Intimate partner violence:     Fear of current or ex partner: Not on file     Emotionally abused: Not on file     Physically abused: Not on file      Forced sexual activity: Not on file   Other Topics Concern     Not on file   Social History Narrative     Not on file       No results found for: LDLCALC     Immunization History   Administered Date(s) Administered     DT (pediatric) 03/17/2004     Td, Adult, Absorbed 03/17/2004     Td,adult,historic,unspecified 03/17/2004     Tdap 03/09/2011     There are no preventive care reminders to display for this patient.

## 2021-06-24 NOTE — TELEPHONE ENCOUNTER
Left message to call back for:PT  Information to relay to patient:  Left message to call and schedule inr only appointment.

## 2021-06-24 NOTE — TELEPHONE ENCOUNTER
Unclear based on notes whether this medication should be continued - states was discontinued in February. Inés VIZCARRA called pharmacy and pt to discuss this - pharmacy thinks pt requested refill, unable to reach pt at this time. Will not refill unless pt calls and says she needs it.    Kristyn Bañuelos MD  [facility]

## 2021-06-24 NOTE — TELEPHONE ENCOUNTER
Call placed to pt. To schedule physical with Dr. Napoles.  Left message to give us a call.    Please schedule physical if she calls.    Thank you.

## 2021-06-24 NOTE — TELEPHONE ENCOUNTER
RN cannot approve Refill Request    RN can NOT refill this medication medication not on med list. Last office visit: 9/19/2018 Kenya Napoles MD Last Physical: 2/28/2019 Last MTM visit: Visit date not found Last visit same specialty: 9/19/2018 Kenya Napoles MD.  Next visit within 3 mo: Visit date not found  Next physical within 3 mo: Visit date not found      Radha Penn, [facility] Triage/Med Refill 3/10/2019    Requested Prescriptions   Pending Prescriptions Disp Refills     citalopram (CELEXA) 40 MG tablet [Pharmacy Med Name: CITALOPRAM 40MG TABLETS] 90 tablet 0     Sig: TAKE 1 TABLET(40 MG) BY MOUTH DAILY    SS[facility] Refill Protocol  Passed - 3/9/2019  5:33 PM       Passed - PCP or prescribing provider visit in last year    Last office visit with prescriber/PCP: 9/19/2018 Kenya Napoles MD OR same dept: Visit date not found OR same specialty: 9/19/2018 Kenya Napoels MD  Last physical: 2/28/2019 Last MTM visit: Visit date not found   Next visit within 3 mo: Visit date not found  Next physical within 3 mo: Visit date not found  Prescriber OR PCP: Kenya Napoles MD  Last diagnosis associated with med order: 1. Moderate episode of recurrent major depressive disorder (H)  - citalopram (CELEXA) 40 MG tablet [Pharmacy Med Name: CITALOPRAM 40MG TABLETS]; TAKE 1 TABLET(40 MG) BY MOUTH DAILY  Dispense: 90 tablet; Refill: 0    If protocol passes may refill for 12 months if within 3 months of last provider visit (or a total of 15 months).

## 2021-06-24 NOTE — TELEPHONE ENCOUNTER
ANTICOAGULATION MANAGEMENT PROGRAM    Dr. Napoles,     Our records indicate that Prerna remains past due for an INR check. Prerna was contacted multiple times over at least the last 8 weeks to attempt to arrange a follow up appointment.    Prerna last had an INR checked on 11/14/18 which was 2.7 and was due for follow up on 12/12/18      At this time, options for management are:      Move Prerna to noncompliant status within the program. While in noncompliant status the patient would continue to be contacted every 6 weeks by the anticoagulation program to attempt to schedule an INR. You will be notified and asked to co-sign each contact attempt to make you aware of patient's ongoing noncompliance.     -or-      Discharge Prerna from the anticoagulation management program and returned back to you for management.    Please advise if anticoagulation program should proceed with moving Prerna to noncompliant status or discharging back to your care.      Thank you,     [facility]  [phone]
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
## 2021-06-28 NOTE — PROGRESS NOTES
"Progress Notes by Ruma Andino AuD at 10/30/2019  1:30 PM     Author: Ruma Andino AuD Service: -- Author Type: Audiologist    Filed: 10/30/2019  2:28 PM Encounter Date: 10/30/2019 Status: Signed    : Ruma Andino AuD (Audiologist)       Audiology only; referred by Kenya Napoles    Summary:  Audiology visit completed. Please see audiogram below or under \"media\" tab for history and results.    Transducer:  Both insert phones and circumaural headphones were used.    Reliability:    Good    Recommendations:  Follow-up with PCP; retest hearing annually (to monitor) or per medical management/patient concern.  Wear hearing protection consistently in noise to preserve residual hearing sensitivity.  Prerna Pizarro is a potential binaural amplification candidate, if patient motivation exists and medical clearance is granted. Ms. Pizarro was given a copy of today's audiogram as well as a copy of the [facility] consumer brochure on the purchase of amplification. She will call Audiology scheduling should she wish to schedule a hearing aid evaluation appointment. Ms. Pizarro expressed verbal understanding of this information and plan.    Kalpesh Aceves, [facility]  [place] Licensed Audiologist [id number]           "

## 2021-06-28 NOTE — PROGRESS NOTES
Progress Notes by Louie Schultz MD at 2019 10:40 AM     Author: Louie Schultz MD Service: -- Author Type: Physician    Filed: 2019  9:42 AM Encounter Date: 2019 Status: Signed    : Louie Schultz MD (Physician)       [facility]in Consult      Assessment:     1. varicose veins, bilateral   2. spider veins, bilateral     Plan:     1. Treatment options of conservative therapy of stockings use, exercise, weight loss,  elevating legs when possible.    2. Script for compression stockings 20-30 mm hg  3. Ultrasound to evaluate legs for incompetency of both deep and superficial system .   4. Surgical treatment, discussed briefly  5. Follow up: after ultrasound.   6. Call for any questions concerns or issues    Subjective:      Prerna Pizarro is a 63 y.o. female  who was referred by Kenya Napoles MD  for evaluation of varicose veins. Symptoms include pain, aching, fatigue, burning, edema and dermatitis. Patient has history of leg selling, pain and vein issues that have progressed. Pain and symptoms have affected daily living and work activities needing medications. Here for evaluation today. Stocking use with compression stockings of 20-30 mm hg or greater for greater then 3 months    Allergies:Contrast [iohexol]; Diatrizoate meglumine; Iodine; and Tetracyclines    Past Medical History:   Diagnosis Date   ? Anxiety    ? Depression    ? ETOH abuse    ? Factor V Leiden (H)    ? Ovarian mass    ? Seasonal allergies        Past Surgical History:   Procedure Laterality Date   ? ABDOMINAL SURGERY     ? APPENDECTOMY     ? [place]  DELIVERY ONLY      Description:  Section;  Recorded: 2011;   ? [place] DILATION/CURETTAGE,DIAGNOSTIC      Description: Dilation And Curettage;  Recorded: 2011;   ? [place] GASTRIC BYPASS,OBESE<150CM DIONICIO-EN-Y      Description: Gastric Surgery For Morbid Obesity Bypass With Dionicio-en-Y;  Recorded: 2011;   ? [place] INCISE SPINAL CORD TRACT(S)       "Description: Laminectomy With Myelotomy Cervical;  Recorded: 08/24/2011;   ? [place] REVISE MEDIAN N/CARPAL TUNNEL SURG Right 5/24/2019    Procedure: RELEASE, CARPAL TUNNEL;  Surgeon: Randolph Hernandez MD;  Location: [facility];  Service: Hand   ? TRANSURETHRAL RESECTION OF BLADDER TUMOR         Current Outpatient Medications   Medication Sig   ? buPROPion (WELLBUTRIN XL) 300 MG 24 hr tablet Take 1 tablet (300 mg total) by mouth daily.   ? multivitamin with minerals (THERA-M) 9 mg iron-400 mcg Tab tablet Take 1 tablet by mouth at bedtime.          ? traZODone (DESYREL) 50 MG tablet Take 1 tablet (50 mg total) by mouth at bedtime.   ? varenicline (CHANTIX) 1 mg tablet Take 1 tab by mouth two times a day. Take after eating with a full glass of water. NOTE: Dispense as maintenance for refills only.   ? warfarin (COUMADIN/JANTOVEN) 10 MG tablet TAKE 1 TABLET BY MOUTH DAILY       Family History   Problem Relation Age of Onset   ? Brain cancer Father    ? Varicose Veins Father    ? Cancer Sister         ovarian   ? Esophageal cancer Mother    ? Alcohol abuse Mother    ? Cancer Mother         throat   ? Alcohol abuse Brother    ? No Medical Problems Brother    ? No Medical Problems Brother    ? No Medical Problems Brother         reports that she has been smoking cigarettes. She has a 27.75 pack-year smoking history. She has never used smokeless tobacco. She reports that she does not drink alcohol or use drugs.      Review of Systems  Pertinent items are noted in HPI.  A 12 point comprehensive review of systems was negative except as noted.      Objective:     Vitals:    11/12/19 1049   BP: 100/72   Pulse: 76   Resp: 16   Temp: 98.5  F (36.9  C)   TempSrc: Oral   Weight: 205 lb (93 kg)   Height: 5' 3\" (1.6 m)     Body mass index is 36.31 kg/m .    EXAM:  GENERAL: This is a well-developed 63 y.o. female who appears her stated age  HEAD: normocephalic  HEENT: Pupils equal and reactive bilaterally  MOUTH: mucus membranes " intact. Normal dentation  CARDIAC: RRR without murmur  CHEST/LUNG:  Clear to auscultation bilaterally  ABDOMEN: Soft, nontender, nondistended, no masses noted   NEUROLOGIC: Focally intact, nonfocal, alert and oriented x 3  INTEGUMENT: No open lesions or ulcers  VASCULAR: Pulses intact, symmetrical upper and lower extremities. There areskin changes consistent with chronic venous insufficiency. Varicose veins present in bilateral greater saphenous distribution. Spider veins present bilateral.                    Imaging:    Pending    Louie Schultz MD  [facility] Surgery Dept.

## 2021-06-28 NOTE — PROGRESS NOTES
Progress Notes by Louie Schultz MD at 2019  8:40 AM     Author: Louie Schultz MD Service: -- Author Type: Physician    Filed: 2019  9:04 AM Encounter Date: 2019 Status: Signed    : Louie Schultz MD (Physician)       Follow Up: Varicose Veins/ Venous Insufficiency    Prerna Pizarro is a 63 y.o.  female here for followup. She has worn stockings now for 3 months. I saw them previously in 2019.  Continued progression of disease and symptoms and issues; reviewed ultrasound results. Patient has ongoing symptoms with pain and swelling needing intervention with pain meds secondary to interfering with daily activities and work. This now inhibits daily chores and activities.     Allergies:Contrast [iohexol]; Diatrizoate meglumine; Iodine; and Tetracyclines    Past Medical History:   Diagnosis Date   ? Anxiety    ? Depression    ? ETOH abuse    ? Factor V Leiden (H)    ? Ovarian mass    ? Seasonal allergies        Past Surgical History:   Procedure Laterality Date   ? ABDOMINAL SURGERY     ? APPENDECTOMY     ? [place]  DELIVERY ONLY      Description:  Section;  Recorded: 2011;   ? [place] DILATION/CURETTAGE,DIAGNOSTIC      Description: Dilation And Curettage;  Recorded: 2011;   ? [place] GASTRIC BYPASS,OBESE<150CM DIONICIO-EN-Y      Description: Gastric Surgery For Morbid Obesity Bypass With Dionicio-en-Y;  Recorded: 2011;   ? [place] INCISE SPINAL CORD TRACT(S)      Description: Laminectomy With Myelotomy Cervical;  Recorded: 2011;   ? [place] REVISE MEDIAN N/CARPAL TUNNEL SURG Right 2019    Procedure: RELEASE, CARPAL TUNNEL;  Surgeon: Randolph Hernandez MD;  Location: [facility] OR;  Service: Hand   ? TRANSURETHRAL RESECTION OF BLADDER TUMOR         CURRENT MEDS:  Current Outpatient Medications on File Prior to Visit   Medication Sig Dispense Refill   ? buPROPion (WELLBUTRIN XL) 300 MG 24 hr tablet Take 1 tablet (300 mg total) by mouth daily. 90 tablet 3   ?  multivitamin with minerals (THERA-M) 9 mg iron-400 mcg Tab tablet Take 1 tablet by mouth at bedtime.            ? traZODone (DESYREL) 50 MG tablet Take 1 tablet (50 mg total) by mouth at bedtime. 90 tablet 3   ? varenicline (CHANTIX) 1 mg tablet Take 1 tab by mouth two times a day. Take after eating with a full glass of water. NOTE: Dispense as maintenance for refills only. 60 tablet 2   ? warfarin (COUMADIN/JANTOVEN) 10 MG tablet TAKE 1 TABLET BY MOUTH DAILY 90 tablet 1     No current facility-administered medications on file prior to visit.        Family History   Problem Relation Age of Onset   ? Brain cancer Father    ? Varicose Veins Father    ? Cancer Sister         ovarian   ? Esophageal cancer Mother    ? Alcohol abuse Mother    ? Cancer Mother         throat   ? Alcohol abuse Brother    ? No Medical Problems Brother    ? No Medical Problems Brother    ? No Medical Problems Brother         reports that she has been smoking cigarettes. She has a 27.75 pack-year smoking history. She has never used smokeless tobacco. She reports that she does not drink alcohol or use drugs.    Review of Systems:  Negative except continued progression of disease.  She still has symptoms and issues progressed over time while she is worn compression.  She is done conservative treatment plan of exercise elevation and weight loss along with wearing compression socks and during this process she says she will continue issues.  She has problems when she stands for long length of time with activities such as washing dishes and vacuuming.  Ultrasound demonstrates insufficiency both junctions though the left is not long enough yet to treat.  The right is a good candidate to do so and should be a good option for some type of intervention.  With progression of disease and symptoms and issues with ongoing attempts to make a difference I think that closure is a great option. Otherwise twelve system of review is  negative.      OBJECTIVE:  There were no vitals filed for this visit.  There is no height or weight on file to calculate BMI.    EXAM:  GENERAL: This is a well-developed 63 y.o. female who appears her stated age  HEAD: normocephalic  HEENT: Pupils equal and reactive bilaterally  CARDIAC: RRR without murmur  CHEST/LUNG:  Clear to auscultation  ABDOMEN: Soft, nontender, nondistended, no masses    NEUROLOGIC: Focally intact, nonfocal  VASCULAR: Pulses intact, symmetrical upper and lower extremities. Varicose veins and Spider veins, bilateral                      Imaging:    Insufficiency of right greater saphenous vein 10 mm at the junction down below the knee to 6 mm below the mean.  Deep systems are intact though she has a chronic DVT in the left popliteal region.  Left greater saphenous vein is is incompetent right at the junction but not long enough to treat.    Formal read to follow    Assessment/Plan:    She has  incompetency and insuffiencey of the [place]  Greater  saphenous vein.  Right measures 10mm at the junction.  Deep systems are intact. No DVTs. Great candidate for endovenous  closure. We spent counseling time more than 50% of visit: 20 minutes today and discussed the procedure. The risks of anesthesia, infection, bleeding, clotting, DVTs, numbess at the insertion site dermatome and  the process and procedure were discussed. Answered all questions today. Will submit this to [employer] insurance if needed for pre approval.Will set this up when approved. Discussed need to have a  and procedure woul take around 30 minutes with total time 2-3 hours. Also understands a small screening ultrasound 2-3 days out to rule out clot formation at the closed vessel.    DIAGNOSIS: Venous insufficiency of the  right greater saphenous vein      PROCEDURE: Endovenous closure of the right greater saphenous vein    Louie Schultz MD  [facility] Surgery Dept.

## 2021-07-03 NOTE — ADDENDUM NOTE
Addendum Note by Kenya Arboleda MD at 2/28/2019  1:10 PM     Author: Kenya Arboleda MD Service: -- Author Type: Physician    Filed: 2/28/2019  2:22 PM Encounter Date: 2/28/2019 Status: Signed    : Kenya Arboleda MD (Physician)    Addended by: KENYA ARBOLEDA on: 2/28/2019 02:22 PM        Modules accepted: Orders

## 2021-07-03 NOTE — ADDENDUM NOTE
Addendum Note by Kaley Beauchamp MD at 12/9/2019 11:53 AM     Author: Kaley Beauchamp MD Service: -- Author Type: Physician    Filed: 12/9/2019 11:53 AM Encounter Date: 12/9/2019 Status: Signed    : Kaley Beauchamp MD (Physician)    Addended by: KALEY BEAUCHAMP on: 12/9/2019 11:53 AM        Modules accepted: Orders

## 2021-07-04 NOTE — ADDENDUM NOTE
Addendum Note by Shaina Small, RN at 3/2/2021  8:58 AM     Author: Shaina Small RN Service: -- Author Type: Registered Nurse    Filed: 3/2/2021  8:58 AM Encounter Date: 2/17/2021 Status: Signed    : Shaina Small RN (Registered Nurse)    Addended by: SHAINA SMALL on: 3/2/2021 08:58 AM        Modules accepted: Orders

## 2021-07-10 ENCOUNTER — HEALTH MAINTENANCE LETTER (OUTPATIENT)
Age: 66
End: 2021-07-10

## 2021-07-13 ENCOUNTER — ANTICOAGULATION THERAPY VISIT (OUTPATIENT)
Dept: ANTICOAGULATION | Facility: CLINIC | Age: 66
End: 2021-07-13

## 2021-07-13 ENCOUNTER — TELEPHONE (OUTPATIENT)
Dept: FAMILY MEDICINE | Facility: CLINIC | Age: 66
End: 2021-07-13

## 2021-07-13 DIAGNOSIS — Z86.718 HISTORY OF DVT (DEEP VEIN THROMBOSIS): ICD-10-CM

## 2021-07-13 DIAGNOSIS — F32.2 SEVERE MAJOR DEPRESSION (H): ICD-10-CM

## 2021-07-13 DIAGNOSIS — D68.2 FACTOR V DEFICIENCY (H): ICD-10-CM

## 2021-07-13 DIAGNOSIS — D68.51 FACTOR V LEIDEN (H): Primary | ICD-10-CM

## 2021-07-13 NOTE — PROGRESS NOTES
ANTICOAGULATION MANAGEMENT     Prerna Pizarro 65 year old female is on warfarin with therapeutic INR result. (Goal INR 2.0-3.0)    Recent labs: (last 7 days)     07/13/21  1456   INR 2.1*       ASSESSMENT     Source(s): Patient/Caregiver Call and Template       Warfarin doses taken: Warfarin taken as instructed    Diet: No new diet changes identified    New illness, injury, or hospitalization: No    Medication/supplement changes: None noted    Signs or symptoms of bleeding or clotting: No    Previous INR: Therapeutic last 2(+) visits    Additional findings: Pt reports she will be changing providers as she just moved to [place]. Encouraged patient to call [facility] when new provider established to be discharged from our program.      PLAN     Recommended plan for no diet, medication or health factor changes affecting INR     Dosing Instructions: Continue your current warfarin dose with next INR in 6 weeks       Summary  As of 7/13/2021    Full warfarin instructions:  5 mg every Mon, Thu; 10 mg all other days   Next INR check:  8/24/2021             Telephone call with Prerna who verbalizes understanding and agrees to plan    Patient offered & declined to schedule next visit    Education provided: Target INR goal and significance of current INR result    Plan made per [facility] anticoagulation protocol    Delaney Hunter RN  Anticoagulation Clinic  7/13/2021    _______________________________________________________________________     Anticoagulation Episode Summary     Current INR goal:  2.0-3.0   TTR:  63.8 % (8.6 mo)   Target end date:     Send INR reminders to:  YURI ALAN    Indications    Factor V Leiden (H) [D68.51]           Comments:           Anticoagulation Care Providers     Provider Role Specialty Phone number    Kenya Napoles MD Referring Family Medicine [phone]

## 2021-07-16 NOTE — TELEPHONE ENCOUNTER
"Routing refill request to provider for review/approval because:  Labs not current:  INR    Last Written Prescription Date:  7/29/20, 4/29/21  Last Fill Quantity: 90/180,  # refills: 1   Last office visit provider:  12/17/20     Requested Prescriptions   Pending Prescriptions Disp Refills     warfarin ANTICOAGULANT (COUMADIN) 10 MG tablet 90 tablet 1       Vitamin K Antagonists Failed - 7/13/2021  2:50 PM        Failed - INR is within goal in the past 6 weeks     Confirm INR is within goal in the past 6 weeks.     Recent Labs   Lab Test 07/13/21  1456   INR 2.1*                       Passed - Recent (12 mo) or future (30 days) visit within the authorizing provider's specialty     Patient has had an office visit with the authorizing provider or a provider within the authorizing providers department within the previous 12 mos or has a future within next 30 days. See \"Patient Info\" tab in inbasket, or \"Choose Columns\" in Meds & Orders section of the refill encounter.              Passed - Medication is active on med list        Passed - Patient is 18 years of age or older        Passed - Patient is not pregnant        Passed - No positive pregnancy on file in past 12 months           venlafaxine (EFFEXOR-XR) 37.5 MG 24 hr capsule 180 capsule 1     Sig: [VENLAFAXINE (EFFEXOR XR) 37.5 MG 24 HR CAPSULE] Take 1 capsule by mouth daily for 1 week and then increase to 2 capsules daily.       Serotonin-Norepinephrine Reuptake Inhibitors  Failed - 7/13/2021  2:50 PM        Failed - PHQ-9 score of less than 5 in past 6 months     Please review last PHQ-9 score.           Failed - Normal serum creatinine on file in past 12 months     Recent Labs   Lab Test 05/26/19  0621   CR 0.75       Ok to refill medication if creatinine is low          Failed - Recent (6 mo) or future (30 days) visit within the authorizing provider's specialty     Patient had office visit in the last 6 months or has a visit in the next 30 days with authorizing " "provider or within the authorizing provider's specialty.  See \"Patient Info\" tab in inbasket, or \"Choose Columns\" in Meds & Orders section of the refill encounter.            Passed - Blood pressure under 140/90 in past 12 months     BP Readings from Last 3 Encounters:   12/17/20 110/70   12/15/20 130/60   12/14/20 117/59                 Passed - Medication is active on med list        Passed - Patient is age 18 or older        Passed - No active pregnancy on record        Passed - No positive pregnancy test in past 12 months             Narendra Long RN 07/16/21 1:36 PM  "

## 2021-07-19 RX ORDER — VENLAFAXINE HYDROCHLORIDE 37.5 MG/1
CAPSULE, EXTENDED RELEASE ORAL
Qty: 180 CAPSULE | Refills: 1 | Status: SHIPPED | OUTPATIENT
Start: 2021-07-19

## 2021-07-19 RX ORDER — WARFARIN SODIUM 10 MG/1
TABLET ORAL
Qty: 90 TABLET | Refills: 1 | Status: SHIPPED | OUTPATIENT
Start: 2021-07-19

## 2021-07-24 RX ORDER — VENLAFAXINE HYDROCHLORIDE 75 MG/1
CAPSULE, EXTENDED RELEASE ORAL
Qty: 173 CAPSULE | OUTPATIENT
Start: 2021-07-24

## 2021-09-01 ENCOUNTER — TELEPHONE (OUTPATIENT)
Dept: FAMILY MEDICINE | Facility: CLINIC | Age: 66
End: 2021-09-01

## 2021-09-01 DIAGNOSIS — D68.51 FACTOR V LEIDEN (H): Primary | ICD-10-CM

## 2021-09-01 NOTE — TELEPHONE ENCOUNTER
ANTICOAGULATION     Prerna Pizarro is overdue for INR check.      spoke to Prerna who states she has moved to [place] and established care with a new PCP there who is taking over management of warfarin and INRs.         Prerna Pizarro is being discharged from the [facility] ([facility]).    Reason for discharge: care has been transferred to new PCP    Anticoagulation episode resolved, ACC referral closed and INR Standing order discontinued    If patient needs warfarin management in the future, please send a new referral    Susan Estrada RN

## 2021-09-04 ENCOUNTER — HEALTH MAINTENANCE LETTER (OUTPATIENT)
Age: 66
End: 2021-09-04

## 2022-01-17 DIAGNOSIS — F33.1 MODERATE EPISODE OF RECURRENT MAJOR DEPRESSIVE DISORDER (H): ICD-10-CM

## 2022-01-19 NOTE — TELEPHONE ENCOUNTER
"Routing refill request to provider for review/approval because:  Labs not current:  PHQ9  Patient needs to be seen because it has been more than 1 year since last office visit.    Last Written Prescription Date:  1/22/21  Last Fill Quantity: 90,  # refills: 3   Last office visit provider: 12/17/2020      Requested Prescriptions   Pending Prescriptions Disp Refills     buPROPion (WELLBUTRIN XL) 300 MG 24 hr tablet [Pharmacy Med Name: BUPROPION XL 300MG TABLETS] 90 tablet 3     Sig: TAKE 1 TABLET(300 MG) BY MOUTH DAILY       SSRIs Protocol Failed - 1/17/2022  5:22 AM        Failed - PHQ-9 score less than 5 in past 6 months     Please review last PHQ-9 score.           Failed - Recent (6 mo) or future (30 days) visit within the authorizing provider's specialty     Patient had office visit in the last 6 months or has a visit in the next 30 days with authorizing provider or within the authorizing provider's specialty.  See \"Patient Info\" tab in inbasket, or \"Choose Columns\" in Meds & Orders section of the refill encounter.            Passed - Medication is Bupropion     If the medication is Bupropion (Wellbutrin), and the patient is taking for smoking cessation; OK to refill.          Passed - Medication is active on med list        Passed - Patient is age 18 or older        Passed - No active pregnancy on record        Passed - No positive pregnancy test in last 12 months             Rosalee Negro RN 01/19/22 7:45 AM  "

## 2022-01-20 RX ORDER — BUPROPION HYDROCHLORIDE 300 MG/1
TABLET ORAL
Qty: 90 TABLET | Refills: 3 | Status: SHIPPED | OUTPATIENT
Start: 2022-01-20

## 2022-02-19 ENCOUNTER — HEALTH MAINTENANCE LETTER (OUTPATIENT)
Age: 67
End: 2022-02-19

## 2022-04-04 NOTE — LETTER
4/4 - 4.5, confirmatory Letter by Usman Covarrubias RN at      Author: Usman Covarrubias RN Service: -- Author Type: --    Filed:  Encounter Date: 7/5/2019 Status: (Other)         Prerna Pizarro  [address] [place] [address] [address] [address]      August 13, 2019      Dear Ms. Pizarro,    You are currently under the care of [facility] for your warfarin therapy.  We have attempted to contact you several times regarding your warfarin therapy but have not received a return call from you.  Our records show that your last INR was on 6/14/2019 and you were due to come back on: 6/28/2019.     The correct dose of warfarin for you may change from time to time based on your INR result. We would like to ensure that your warfarin dose is correct and that you are not having any side effects. It is not safe for you to be on Warfarin if your INR is not checked regularly. If your INR is too high, serious bleeding can occur. If your INR is too low, blood clots can form and lead to heart attack, stroke or a blood clot in the lung.  Getting your INR checked as instructed is required in order for the [facility] to continue managing and refilling your warfarin.    If you have been told to stop taking warfarin or your warfarin is being managed by another healthcare provider, please call and inform our staff so we can update your records.  If this is not the case, please contact us at [phone] to schedule an INR appointment as soon as possible. Our clinic staff is available Monday through Friday 8:00 am to 5:00 pm.     If we do not hear from you, it may lead to being discharged from the Anticoagulation Management Program.  If this occurs, your [facility] primary care provider would then take over managing your warfarin and you would need to have regular office visits with your provider for warfarin management.    Sincerely,       [facility]

## 2022-08-18 DIAGNOSIS — Z71.6 TOBACCO ABUSE COUNSELING: ICD-10-CM

## 2022-08-18 RX ORDER — VARENICLINE TARTRATE 1 MG/1
TABLET, FILM COATED ORAL
Qty: 180 TABLET | Refills: 1 | OUTPATIENT
Start: 2022-08-18

## 2022-08-18 NOTE — TELEPHONE ENCOUNTER
Details  Date Type Department Care Team Description   01/06/2022 Comprehensive Visit Department of Family Medicine in [place], [place]    [address]    [place] [address]    [phone]   Orly Gray M.D.    [address] [place] [id number] [phone] (Work)    [phone] (Fax)   Screening Mammogram Average Risk Patient (Primary Dx);   Screening Cancer Colon;   Pap Smear Examination;   Screening Examination For Human Papillomavirus;   Anxiety Generalized Disorder;   Depression Major Recurrent (HCC);   Thrombosis Deep Vein Personal History;   Restless Leg Syndrome;   Nevus Changing;   Osteoarthritis

## 2022-08-20 DIAGNOSIS — Z71.6 TOBACCO ABUSE COUNSELING: ICD-10-CM

## 2022-08-20 RX ORDER — VARENICLINE TARTRATE 1 MG/1
TABLET, FILM COATED ORAL
Qty: 180 TABLET | Refills: 1 | OUTPATIENT
Start: 2022-08-20

## 2022-08-21 NOTE — TELEPHONE ENCOUNTER
01/06/2022 Comprehensive Visit Department of Family Medicine in [place], [place]    [address]    [place] [address]    [phone]   Orly Gray M.D.    [address] [place] [id number] [phone] (Work)    [phone] (Fax)   Screening Mammogram Average Risk Patient (Primary Dx);   Screening Cancer Colon;   Pap Smear Examination;   Screening Examination For Human Papillomavirus;   Anxiety Generalized Disorder;   Depression Major Recurrent (HCC);   Thrombosis Deep Vein Personal History;   Restless Leg Syndrome;   Nevus Changing;   Osteoarthritis

## 2022-10-16 ENCOUNTER — HEALTH MAINTENANCE LETTER (OUTPATIENT)
Age: 67
End: 2022-10-16

## 2023-03-26 ENCOUNTER — HEALTH MAINTENANCE LETTER (OUTPATIENT)
Age: 68
End: 2023-03-26

## 2024-03-23 ENCOUNTER — HEALTH MAINTENANCE LETTER (OUTPATIENT)
Age: 69
End: 2024-03-23

## 2024-06-01 ENCOUNTER — HEALTH MAINTENANCE LETTER (OUTPATIENT)
Age: 69
End: 2024-06-01

## 2025-03-09 NOTE — PATIENT INSTRUCTIONS - HE
03/09/25 0300   Safety Checks   Patient Location During Safety Check Patient room   Patient Activity/Behavior Observed Lying in bed with eyes closed     Daylight savings lost 1 hour 2AM-3AM   "Patient Instructions by Fabi Dillon LPN at 11/12/2019 10:40 AM     Author: Fabi Dillon LPN Service: -- Author Type: Licensed Nurse    Filed: 11/12/2019 11:00 AM Encounter Date: 11/12/2019 Status: Addendum    : Fabi Dillon LPN (Licensed Nurse)    Related Notes: Original Note by Fabi Dillon LPN (Licensed Nurse) filed at 11/12/2019 10:50 AM       We are prescribing some compression stockings for you. I have included different suppliers that should help you get measured and fitting to ensure proper fitting socks. You should wear these socks as much as you can. It is especially important to wear them with long periods of sitting/standing, long car rides or if you will be flying. Compression socks should get replaced every 4-6 months. They do not need to be worn at night while in bed.    If you do a lot of standing it is good to do calf raises to help keep the blood pumping. If you sit a lot at work it is good to get up periodically to walk around. Elevation of the foot of your bed 4-6\" helps the blood return back to where it is needed.    We would like you to follow up in 3 months after wearing compression socks to see how you are doing. Below is a list of locations where you can  the stockings.    Please call one of the locations below to schedule an appointment to get fitted for your compression stockings. If you received a prescription please bring it with you to your appointment. Some locations are limited to what they carry.    [facility] [place]  [address]   [place] [address]   Phone: [phone]   Fax: [phone]    [facility]    [facility]  [address]  [place] [address]  Phone: [phone]    [facility]   [address] ([facility])  [place] [address]  Phone: [phone]    [facility] [place]  [address]   [place] [address]   Phone: [phone]    [facility]  [address]  [place] [address]  Phone: [phone]    [facility]   [address]  [place] [address]  Phone: [phone]    [facility]  [address]  [address]  [place] [address]  Phone: [phone]    [facility]  [address]  [address]  [place] [address]  Phone: [phone]    [place]   [address] [place] [address]   Phone: [phone]    [facility]   [address]             [address]                 [address]     [place] [address]      [place] [address]         [place] [address]  Phone: [phone]      Phone: [phone]            Phone: [phone]  Fax: [phone]          Fax:[phone]                 Fax: [phone]                                     Kareem Jade  [phone]  [email or web address]      Understanding Spider and Varicose Veins    Do you often hide your legs because of the way they look? You may have noticed tiny red or blue bursts (spider veins). Or maybe you have veins that bulge or look twisted (varicose veins). If so, there are treatments that can help.    What Are the Symptoms?  Spider veins or varicose veins may never be a problem. But sometimes they can cause legs to ache or swell. Your legs may also feel heavy and tired, or like theyre burning. These symptoms may be more severe at the end of the day. Prolonged sitting or standing can also make your symptoms worse.        Who Gets Spider and Varicose Veins?  Anyone can get spider or varicose veins. But vein problems tend to be hereditary (run in families). Other factors that can affect veins  include:    Pregnancy, hormones, and birth control pills    A job where you stand or sit a lot    Extra weight or lack of exercise    Age    What Can Be Done?  Spider and varicose veins can affect the way you feel about yourself. Talk to your doctor about your concerns. There are treatments that can ease symptoms and make your legs look better.  Your Treatment Options  Treatment may include self-care, sclerotherapy (injecting veins with a chemical), or surgery. Spider veins and some varicose veins can be treated with sclerotherapy. Large varicose veins may need surgery.    8807-2834 [employer]. [address] [place] [address]. All rights reserved. This information is not intended as a substitute for professional medical care. Always follow your healthcare professional's instructions.      Ultrasound    Thank you for choosing [facility] as partners in your care.  Please read the following information before your appointment.  Feel free to ask questions.    An ultrasound is an exam that uses sound waves to create pictures.  The special camera that takes the pictures is called a transducer.  An ultrasound technologist will perform the exam under the direction of a physician.    Preparation  Ultrasound preps vary.  If you are scheduled for an Aorta Ultrasound, please do not eat or drink anything 8 hours before your exam.  You may take daily medications with a small sip of water.  There is no preparation required for any of the other ultrasound exams performed at [facility].    The Examination  You may or may not need to change clothes for your exam.  The technologist will explain the exam to you.  He or she will ask you a few questions and answer any questions you may have.    You will lie on a table and a gel will be applied to your skin.  A small handheld instrument called a transducer will be moved across the area we are looking at while pictures are taken.   Also, your exam may include measuring your blood pressure on your arms, legs and/or toes.    When the Exam Is Completed  Your physician will receive the results of the exam and explain them to you.  You may return to your normal diet and activities unless otherwise directed by your doctor.  Feel free to ask questions if something is not clear to you.  We welcome comments.    At [facility], we are dedicated to providing the best possible care.  Thank you for taking time to read these instructions.  We hope your experience is as pleasant as possible.      You are scheduled for the following exam(s):    [x]Venous Duplex:  Evaluates the veins that carry blood to the heart from the arms and/or legs.  Gel is applied to the skin of the arms and/or legs.  A transducer will be placed on the gel covered areas to obtain images and evaluate flow in the veins.  This exam takes approximately 30 minutes per arm/leg.    Self-Care for Spider and Varicose Veins    Your doctor may suggest that you try self-care. Exercising and maintaining a healthy weight may keep problem veins from getting worse. Wearing elastic stockings and elevating your legs can help improve blood flow. Taking breaks when you sit or stand helps, too.  Exercising        Exercising is good for your veins because it improves blood flow. Walking, cycling, or swimming are great exercises for vein health. But be sure to check with your doctor before starting any exercise program. Also, keep these hints in mind:    When exercising, start out slowly and try to build up to 30 minutes on most days.    Elevate your legs above heart level after exercise to keep blood from pooling in veins.  Maintaining a healthy weight  Being overweight puts extra pressure on your veins. To maintain a healthy weight, try these tips:    Choose lean meats, fish, and skinless chicken.    Use low-fat dairy products.    Eat foods high in fiber, such as whole grains, fruits, and vegetables.    Cut  down on sugar, salt, and fats such as butter.    Exercise regularly.  Wearing elastic stockings  Elastic stockings gently squeeze veins so blood flows upward. If you need elastic stockings, your doctor can prescribe them for you. Follow your doctors advice about how and when to wear them. Elastic stockings should fit snugly.  Elevating your legs  Raising your legs above heart level will help relieve swelling and keep blood from pooling in veins. Try to elevate your legs for 15 to 20 minutes at the end of the day, and whenever youre relaxing. To make sure your legs are raised above heart level, prop them up on cushions or large pillows.  When sitting and standing  To keep blood moving when you have to sit or stand for long periods, try these tips:    At work, take walking breaks instead of coffee breaks. Walk during your lunch hour. Or try flexing your feet up and down 10 times each hour.    When standing, raise yourself up and down on your toes, or rock back and forth on your heels.    0177-6361 [employer]. [address] [place] [address], [place] [address]. All rights reserved. This information is not intended as a substitute for professional medical care. Always follow your healthcare professional's instructions.